# Patient Record
Sex: FEMALE | Race: WHITE
[De-identification: names, ages, dates, MRNs, and addresses within clinical notes are randomized per-mention and may not be internally consistent; named-entity substitution may affect disease eponyms.]

---

## 2019-07-08 ENCOUNTER — HOSPITAL ENCOUNTER (INPATIENT)
Dept: HOSPITAL 92 - ERS | Age: 64
LOS: 3 days | Discharge: HOME | DRG: 421 | End: 2019-07-11
Attending: INTERNAL MEDICINE | Admitting: INTERNAL MEDICINE
Payer: COMMERCIAL

## 2019-07-08 VITALS — BODY MASS INDEX: 19.5 KG/M2

## 2019-07-08 DIAGNOSIS — J47.9: ICD-10-CM

## 2019-07-08 DIAGNOSIS — E44.0: ICD-10-CM

## 2019-07-08 DIAGNOSIS — C80.1: ICD-10-CM

## 2019-07-08 DIAGNOSIS — Z87.891: ICD-10-CM

## 2019-07-08 DIAGNOSIS — Z66: ICD-10-CM

## 2019-07-08 DIAGNOSIS — I82.431: ICD-10-CM

## 2019-07-08 DIAGNOSIS — Z79.899: ICD-10-CM

## 2019-07-08 DIAGNOSIS — C78.01: ICD-10-CM

## 2019-07-08 DIAGNOSIS — I82.411: ICD-10-CM

## 2019-07-08 DIAGNOSIS — C78.7: Primary | ICD-10-CM

## 2019-07-08 LAB
ALBUMIN SERPL BCG-MCNC: 3.6 G/DL (ref 3.4–4.8)
ALP SERPL-CCNC: 415 U/L (ref 40–150)
ALT SERPL W P-5'-P-CCNC: 39 U/L (ref 8–55)
ANION GAP SERPL CALC-SCNC: 18 MMOL/L (ref 10–20)
AST SERPL-CCNC: 80 U/L (ref 5–34)
BACTERIA UR QL AUTO: (no result) HPF
BASOPHILS # BLD AUTO: 0 THOU/UL (ref 0–0.2)
BASOPHILS NFR BLD AUTO: 0.3 % (ref 0–1)
BILIRUB SERPL-MCNC: 1.2 MG/DL (ref 0.2–1.2)
BUN SERPL-MCNC: 18 MG/DL (ref 9.8–20.1)
CALCIUM SERPL-MCNC: 9.5 MG/DL (ref 7.8–10.44)
CHLORIDE SERPL-SCNC: 95 MMOL/L (ref 98–107)
CK SERPL-CCNC: 477 U/L (ref 29–168)
CO2 SERPL-SCNC: 24 MMOL/L (ref 23–31)
CREAT CL PREDICTED SERPL C-G-VRATE: 0 ML/MIN (ref 70–130)
EOSINOPHIL # BLD AUTO: 0.1 THOU/UL (ref 0–0.7)
EOSINOPHIL NFR BLD AUTO: 0.5 % (ref 0–10)
GLOBULIN SER CALC-MCNC: 3.1 G/DL (ref 2.4–3.5)
GLUCOSE SERPL-MCNC: 91 MG/DL (ref 80–115)
HGB BLD-MCNC: 14.2 G/DL (ref 12–16)
LIPASE SERPL-CCNC: 9 U/L (ref 8–78)
LYMPHOCYTES # BLD: 2.6 THOU/UL (ref 1.2–3.4)
LYMPHOCYTES NFR BLD AUTO: 14.5 % (ref 21–51)
MCH RBC QN AUTO: 30.1 PG (ref 27–31)
MCV RBC AUTO: 92.8 FL (ref 78–98)
MONOCYTES # BLD AUTO: 1.7 THOU/UL (ref 0.11–0.59)
MONOCYTES NFR BLD AUTO: 9.2 % (ref 0–10)
NEUTROPHILS # BLD AUTO: 13.5 THOU/UL (ref 1.4–6.5)
NEUTROPHILS NFR BLD AUTO: 75.6 % (ref 42–75)
PLATELET # BLD AUTO: 320 THOU/UL (ref 130–400)
POTASSIUM SERPL-SCNC: 4.2 MMOL/L (ref 3.5–5.1)
PROT UR STRIP.AUTO-MCNC: 30 MG/DL
RBC # BLD AUTO: 4.71 MILL/UL (ref 4.2–5.4)
RBC UR QL AUTO: (no result) HPF (ref 0–3)
SODIUM SERPL-SCNC: 133 MMOL/L (ref 136–145)
WBC # BLD AUTO: 17.9 THOU/UL (ref 4.8–10.8)
WBC UR QL AUTO: (no result) HPF (ref 0–3)

## 2019-07-08 PROCEDURE — 88342 IMHCHEM/IMCYTCHM 1ST ANTB: CPT

## 2019-07-08 PROCEDURE — 84484 ASSAY OF TROPONIN QUANT: CPT

## 2019-07-08 PROCEDURE — 80074 ACUTE HEPATITIS PANEL: CPT

## 2019-07-08 PROCEDURE — 84100 ASSAY OF PHOSPHORUS: CPT

## 2019-07-08 PROCEDURE — 77012 CT SCAN FOR NEEDLE BIOPSY: CPT

## 2019-07-08 PROCEDURE — 82550 ASSAY OF CK (CPK): CPT

## 2019-07-08 PROCEDURE — 83880 ASSAY OF NATRIURETIC PEPTIDE: CPT

## 2019-07-08 PROCEDURE — 71045 X-RAY EXAM CHEST 1 VIEW: CPT

## 2019-07-08 PROCEDURE — 93005 ELECTROCARDIOGRAM TRACING: CPT

## 2019-07-08 PROCEDURE — 81015 MICROSCOPIC EXAM OF URINE: CPT

## 2019-07-08 PROCEDURE — 83615 LACTATE (LD) (LDH) ENZYME: CPT

## 2019-07-08 PROCEDURE — 88307 TISSUE EXAM BY PATHOLOGIST: CPT

## 2019-07-08 PROCEDURE — 80048 BASIC METABOLIC PNL TOTAL CA: CPT

## 2019-07-08 PROCEDURE — 82105 ALPHA-FETOPROTEIN SERUM: CPT

## 2019-07-08 PROCEDURE — 80053 COMPREHEN METABOLIC PANEL: CPT

## 2019-07-08 PROCEDURE — 36415 COLL VENOUS BLD VENIPUNCTURE: CPT

## 2019-07-08 PROCEDURE — 85025 COMPLETE CBC W/AUTO DIFF WBC: CPT

## 2019-07-08 PROCEDURE — 83690 ASSAY OF LIPASE: CPT

## 2019-07-08 PROCEDURE — 71250 CT THORAX DX C-: CPT

## 2019-07-08 PROCEDURE — 82378 CARCINOEMBRYONIC ANTIGEN: CPT

## 2019-07-08 PROCEDURE — 88341 IMHCHEM/IMCYTCHM EA ADD ANTB: CPT

## 2019-07-08 PROCEDURE — 85610 PROTHROMBIN TIME: CPT

## 2019-07-08 PROCEDURE — 47000 NEEDLE BIOPSY OF LIVER PERQ: CPT

## 2019-07-08 PROCEDURE — 88334 PATH CONSLTJ SURG CYTO XM EA: CPT

## 2019-07-08 PROCEDURE — 85730 THROMBOPLASTIN TIME PARTIAL: CPT

## 2019-07-08 PROCEDURE — 82248 BILIRUBIN DIRECT: CPT

## 2019-07-08 PROCEDURE — 88333 PATH CONSLTJ SURG CYTO XM 1: CPT

## 2019-07-08 PROCEDURE — 84550 ASSAY OF BLOOD/URIC ACID: CPT

## 2019-07-08 PROCEDURE — 81003 URINALYSIS AUTO W/O SCOPE: CPT

## 2019-07-08 NOTE — ULT
VENOUS DOPPLER ULTRASOUND OF THE RIGHT LOWER EXTREMITY:

 

HISTORY:

Right lower extremity edema and pain.

 

TECHNIQUE:

Gray-scale ultrasound with color-flow and spectral Doppler imaging of the deep venous system of the r
ight lower extremity is performed.

 

FINDINGS:

There is absence of flow and compression in the right common femoral, femoral, deep femoral, and popl
iteal veins.  There is partial compression of the minimal flow in the right posterior tibial vein.  T
he right greater saphenous vein is patent.

 

IMPRESSION:

Deep venous thrombosis in the right lower extremity.

 

The results were called over the telephone to Lashawn Ramsey, Nurse Practitioner, in the emergency 
room, at 6 p.m.

 

CODE CR

 

POS: [facility]

## 2019-07-08 NOTE — RAD
PORTABLE CHEST ONE VIEW:

 

Date: 7-8-19

Time: 6:14 p.m.

 

History: Weakness, dehydration. 

 

Comparison: 6-12-19

 

FINDINGS: 

The heart is normal. The aorta is tortuous. The lungs are well expanded without focal areas of consol
idation, pneumothoraces or pleural effusions. 

 

IMPRESSION: 

No acute process. 

 

POS: [facility]

## 2019-07-09 LAB
ANION GAP SERPL CALC-SCNC: 16 MMOL/L (ref 10–20)
BASOPHILS # BLD AUTO: 0 THOU/UL (ref 0–0.2)
BASOPHILS NFR BLD AUTO: 0.3 % (ref 0–1)
BUN SERPL-MCNC: 16 MG/DL (ref 9.8–20.1)
CALCIUM SERPL-MCNC: 9.2 MG/DL (ref 7.8–10.44)
CHLORIDE SERPL-SCNC: 99 MMOL/L (ref 98–107)
CO2 SERPL-SCNC: 23 MMOL/L (ref 23–31)
CREAT CL PREDICTED SERPL C-G-VRATE: 0 ML/MIN (ref 70–130)
EOSINOPHIL # BLD AUTO: 0.1 THOU/UL (ref 0–0.7)
EOSINOPHIL NFR BLD AUTO: 0.6 % (ref 0–10)
GLUCOSE SERPL-MCNC: 84 MG/DL (ref 80–115)
HBCM INDEX: 0.05 S/CO (ref 0–0.79)
HBSAG INDEX: 0.31 S/CO (ref 0–0.99)
HEP A IGM S/CO: 0.1 S/CO (ref 0–0.79)
HEP C INDEX: 0.08 S/CO (ref 0–0.79)
HGB BLD-MCNC: 12.5 G/DL (ref 12–16)
LYMPHOCYTES # BLD: 1.9 THOU/UL (ref 1.2–3.4)
LYMPHOCYTES NFR BLD AUTO: 13.4 % (ref 21–51)
MCH RBC QN AUTO: 30 PG (ref 27–31)
MCV RBC AUTO: 93.8 FL (ref 78–98)
MONOCYTES # BLD AUTO: 1.6 THOU/UL (ref 0.11–0.59)
MONOCYTES NFR BLD AUTO: 11 % (ref 0–10)
NEUTROPHILS # BLD AUTO: 10.6 THOU/UL (ref 1.4–6.5)
NEUTROPHILS NFR BLD AUTO: 74.7 % (ref 42–75)
PLATELET # BLD AUTO: 291 THOU/UL (ref 130–400)
POTASSIUM SERPL-SCNC: 3.9 MMOL/L (ref 3.5–5.1)
RBC # BLD AUTO: 4.16 MILL/UL (ref 4.2–5.4)
SODIUM SERPL-SCNC: 134 MMOL/L (ref 136–145)
WBC # BLD AUTO: 14.2 THOU/UL (ref 4.8–10.8)

## 2019-07-09 RX ADMIN — HYDROCODONE BITARTRATE AND ACETAMINOPHEN PRN TAB: 5; 325 TABLET ORAL at 19:55

## 2019-07-09 RX ADMIN — Medication SCH ML: at 19:55

## 2019-07-09 RX ADMIN — HYDROCODONE BITARTRATE AND ACETAMINOPHEN PRN TAB: 5; 325 TABLET ORAL at 12:03

## 2019-07-09 NOTE — HP
REASON FOR ADMISSION:  Right lower extremity DVT, weight loss, loss of appetite 
with

recent diagnosis of metastasis to liver with unknown primary. 



HISTORY OF PRESENTING ILLNESS:  The patient gives history of feeling very weak 
with

right upper quadrant pain for several weeks and had come here in June.  She had

initial CAT scan done, which showed multiple metastases and she has seen Dr. Delarosa

on the 21st for the same.  Yesterday morning, the patient developed right lower

extremity edema with pain.  She was diagnosed with DVT on arrival here.  She 
also

has severe nausea, vomiting and has not been able to keep anything down.  She 
has

severe loss of appetite.  She has lost nearly 9 pounds in the last 2 weeks.  
She is

scheduled for a PET scan on Thursday and likely biopsy in 1 or 2 days per the

patient.  She has multiple metastases in the liver with unknown primary.  She 
has

not had a colonoscopy in the past. 



PAST MEDICAL AND SURGICAL HISTORY:  History of multiple liver metastases 
diagnosed

in June of this year with unknown primary and tobacco abuse.  No prior surgery. 



CURRENT MEDICATIONS:  None.



ALLERGIES:  NO KNOWN DRUG ALLERGIES.



PERSONAL HISTORY:  Quit smoking recently, was smoking one pack a day for nearly 
30

years.  She also was drinking one glass of wine with dinner.  Does not abuse 
drugs.

She lives with her .  They are  for nearly 29 years.  She does not

have any children, but has a stepson. 



FAMILY HISTORY:  No history of malignancy.



SOCIAL HISTORY:  The patient works for a [employer] company in [place].



CODE STATUS:  DNAR.  I have personally discussed this with the patient in room 
19 in

the ER. 



REVIEW OF SYSTEMS:  CONSTITUTIONAL:  Negative for weight loss or gain, ability 
to

conduct usual activities. 

SKIN:  Negative for rash, itching. 

EYES:  Negative for double vision, pain. 

ENT/MOUTH:  Negative for nose bleeding, neck stiffness, pain, tenderness. 

CARDIOVASCULAR:  Negative for palpitations, dyspnea on exertion, orthopnea. 

RESPIRATORY:  Negative for shortness of breath, wheezing, cough, hemoptysis, 
fever

or night sweats. 

GASTROINTESTINAL:  Negative for poor appetite, abdominal pain, heartburn, nausea
,

vomiting, constipation, or diarrhea. 

GENITOURINARY:  Negative for urgency, frequency, dysuria, nocturia. 

MUSCULOSKELETAL:  Negative for pain, swelling. 

NEUROLOGIC/PSYCHIATRIC:  Negative for anxiety, depression. 

ALLERGY/IMMUNOLOGIC:  Negative for skin rash, bleeding tendency.



PHYSICAL EXAMINATION:

GENERAL:  The patient is a 63-year-old female, who is currently not in any acute

distress. 

VITAL SIGNS:  Blood pressure 146/94, pulse 94 per minute, respiratory rate 18 
per

minute, temperature 98.4 degrees Fahrenheit, saturating 95% on room air. 

NECK:  Supple.  No elevated JVD. 

HEENT:  Eyes:  Extraocular muscles intact.  Pupils reacting to light.  Oral 
cavity:

Mucous membranes are dry.  No exudates or congestion. 

CARDIOVASCULAR SYSTEM:  S1 and S2 heard, regular rhythm. 

RESPIRATORY SYSTEM:  Air entry 1+ bilateral.  Scattered rhonchi plus.  No rales 
or

wheezes. 

ABDOMEN:  Mildly distended.  She has hepatomegaly.  There is tenderness over the

right upper quadrant.  The patient has severe sensitivity on her abdomen and 
feels

ticklish to touch.  No rigidity or guarding. 

EXTREMITIES:  Right lower extremity is edematous and there is erythema as well.

Left lower extremity, no calf tenderness or edema.  Peripheral pulses are 2+

bilateral.  No ischemic ulcerations or gangrene. 

CENTRAL NERVOUS SYSTEM:  No gross focal deficits noted.  The patient is alert,

awake, and oriented well. 

PSYCHIATRIC SYSTEM:  The patient's mood is a bit anxious, otherwise no

hallucinations or delusions. 



LABORATORY DATA:  White count of 17, H and H 14 and 43, platelet count 320 with 
75%

neutrophils, MCV is 92.  Sodium 133, serum chloride 95, BUN 18, creatinine 0.8, 
and

serum glucose 91.  AST 80, ALT 39, alkaline phosphatase 415, total bilirubin 
1.2.

CK levels 477.  Troponin-I less than 0.01.  BNP 26.  Albumin is 3.6.  Lipase is 
9. 



DIAGNOSTIC DATA:  Chest x-ray, portable, one view done shows no acute process.

Right lower extremity ultrasound done shows deep vein thrombosis with absence of

flow on compression in the right common femoral, femoral, deep femoral, and

popliteal veins.  EKG done shows sinus rhythm at 95 beats per minute.  There is 
T

inversion seen in II, III, aVF and V2, V3. 



CLINICAL IMPRESSION AND PLAN:  The patient will be admitted to oncology floor 
for 

right lower extremity deep venous thrombosis with history of multiple 
metastases in

the liver with unknown primary.  She has received a dose of Lovenox in the ER 
and

will continue at 50 mg subcu q.12 hourly.  Please note, the patient says she 
has had

occasional episodes of blood mixed with stool from last 2 weeks.  This will be

closely monitored, and if the patient were to bleed, her Lovenox will be held.  
Her

deep venous thrombosis is likely due to underlying malignancy.  She is also a 
heavy

smoker and we will obtain a CT chest to rule out mass without contrast 
initially and

will obtain with contrast if needed.  Acute hepatitis panel will be obtained as

well.  She has never had colonoscopy in her life.  It is unclear if she has any 
GI

malignancy.  We will obtain CEA levels and AFP levels as well.  The patient is

scheduled for a PET scan on Thursday and she is also scheduled for a biopsy in 
1 or

2 days per patient.  We will obtain oncology consultation with Dr. Delarosa.  Her

overall prognosis is poor to guarded.  She wants to be a do not attempt to

resuscitate and I have discussed code status with her at bedside.  Power of 


will be her . Please note I have seen and examined patient on 07/08/2019.







Job ID:  [id number]



MTDD

## 2019-07-09 NOTE — PRG
DATE OF SERVICE:  07/09/2019



SUBJECTIVE:  The patient actually feels a little bit better this morning.  She 
was

hungry and actually able to eat a fair amount of breakfast, which is the first 
food

she has eaten in a couple of days.  She had a rough night otherwise and was 
unable

to sleep.  She is still held in the emergency department at the moment due to 
lack

of beds on the floor. 



OBJECTIVE:  VITAL SIGNS:  Latest vitals; blood pressure 151/96, pulse 86,

respirations 18, and O2 saturation 95% on room air. 

GENERAL APPEARANCE:  Thin, age-appropriate female, in no distress.  Awake, alert
,

oriented, pleasant, and cooperative. 

HEENT:  JOSE.  No acute lesions. 

NECK:  Supple and symmetric. 

HEART:  Regular rate and rhythm without murmurs, gallops, or rubs. 

LUNGS:  Clear to auscultation bilaterally. 

ABDOMEN:  Soft and nondistended.  Positive bowel sounds. 

EXTREMITIES:  Right lower extremity has fairly diffuse edema without tenderness 
to

palpation. 



LABORATORY DATA:  White count 14.2, hemoglobin 12.5, and platelets 291.  Sodium 
134,

potassium 3.9, chloride 99, and CO2 of 23.  CEA is 16,884.  AFP 2.2.  Hepatitis

screen negative. 



IMAGING STUDIES:  CT of the chest did reveal some diffuse scattered pulmonary

nodules throughout both lungs consistent with metastatic disease.  There is also

evidence of bronchiectasis with suspected mucus plugging in the right middle 
lobe

and lingula. 



IMPRESSION AND PLAN:  

1. Metastatic cancer with lesions in the lung and the liver of unknown primary.
  The

patient still need a PET scan and a biopsy. 

2. Right lower extremity deep venous thrombosis.  In light of the fact that she

needs a biopsy, probably need to keep her on Lovenox and then get the biopsy 
done

and then potentially switch her back over to something like Eliquis.  I will 
discuss

the case with Dr. Delarosa and determine a plan for her going forward. 

3. Nausea and vomiting presently seems to be somewhat better and she was able 
to eat

breakfast this morning.  We will continue with the p.r.n. antiemetics. 







Job ID:  [id number]



MTDD

## 2019-07-09 NOTE — CT
CT CHEST WITHOUT CONTRAST:



 7/9/2019



2:45  a.m.



CLINICAL INDICATION:

A 63-year-old female with known hepatic metastatic disease, dehydration, weight loss, and weakness.



COMPARISON:

CT abdomen and pelvis, dated June 12, 2019.



FINDINGS:

Lung and Large Airways:  There is moderate emphysema.  There are scattered sub-4 mm pulmonary nodules
 throughout both lungs.  The largest pulmonary nodule is seen within the superior segment of right

lower lobe, measuring 4 mm, on image 28 of series 3.  There is bronchiectasis and suspected mucous pl
ugging involving the right middle lobe and lingula, which can be seen with MAC type infections.

Pleura:  No effusion or mass.

Vessels:  There are scattered vascular calcifications of the thoracic aorta and coronary arteries.  T
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 

Heart:  Normal appearing.  No pericardial effusion..

Mediastinum and Marisol:  Normal.

Chest Wall and Lower Neck:  Normal.

Upper Abdomen:  There are numerous hepatic hypodensities, suspicious for metastatic disease.

Bones:  No acute osseous abnormality.



IMPRESSION:

1.  Scattered pulmonary nodules throughout both lungs, many of which are below 4 mm in size.  The lar
gest pulmonary nodule is seen within the superior segment of the right lower lobe, measuring 4 mm. 

Short-term followup is recommended to evaluate stability versus interval growth.  Metastatic disease 
is not excluded.



2.  Bronchiectasis with suspected mucous plugging within the right middle lobe and lingula can be see
n with Mycobacterium avium complex type infections.  Short-term followup is recommended.



3.  Hepatic hypodensities, suspicious for metastatic disease.



Transcribed Date/Time: 7/9/2019 10:12 AM



Reported By: Jama Simeon 

Electronically Signed:  7/9/2019 12:27 PM

## 2019-07-10 LAB
APTT PPP: 28.5 SEC (ref 22.9–36.1)
INR PPP: 1.1
PROTHROMBIN TIME: 14 SEC (ref 12–14.7)

## 2019-07-10 PROCEDURE — 0FB24ZX EXCISION OF LEFT LOBE LIVER, PERCUTANEOUS ENDOSCOPIC APPROACH, DIAGNOSTIC: ICD-10-PCS | Performed by: RADIOLOGY

## 2019-07-10 RX ADMIN — HYDROCODONE BITARTRATE AND ACETAMINOPHEN PRN TAB: 5; 325 TABLET ORAL at 09:21

## 2019-07-10 RX ADMIN — Medication SCH ML: at 09:00

## 2019-07-10 RX ADMIN — Medication SCH ML: at 22:13

## 2019-07-10 RX ADMIN — HYDROCODONE BITARTRATE AND ACETAMINOPHEN PRN TAB: 5; 325 TABLET ORAL at 19:59

## 2019-07-10 NOTE — PDOC.PN
- Subjective


Encounter Start Date: 07/10/19


Encounter Start Time: 11:15





Doing well.  Had biopsy without difficulty.  Minimal soreness.





- Objective


Resuscitation Status - Order Detail:





07/09/19 02:38


Resuscitation Status Routine 


   Resuscitation Status: DNAR: NO Resuscitation


   Discussed with: d/w patient in ER room 19


   Additional comments: POA: 








Vital Signs & Weight: 


 Vital Signs (12 hours)











  Temp Pulse Resp BP Pulse Ox


 


 07/10/19 11:46  97.6 F  75  20  149/74 H  99


 


 07/10/19 08:37  97.9 F  78  18  146/78 H  94 L


 


 07/10/19 03:46  98.1 F  72  16  139/76  92 L








 Weight











Weight                         110 lb














I&O: 


 











 07/09/19 07/10/19 07/11/19





 06:59 06:59 06:59


 


Intake Total  1690 


 


Balance  1690 











Result Diagrams: 


 07/09/19 04:37





 07/09/19 04:37





Phys Exam





- Physical Examination


Respiratory: no wheezing, no rales, no rhonchi


Cardiovascular: RRR, no significant murmur


Gastrointestinal: soft, non-tender, no distention, positive bowel sounds


RLE Edema


Neurological: non-focal


Psychiatric: normal affect, A&O x 3





Dx/Plan


(1) Malignant neoplasm


Code(s): C80.1 - MALIGNANT (PRIMARY) NEOPLASM, UNSPECIFIED   Status: Acute   





(2) Metastases to the liver


Code(s): C78.7 - SECONDARY MALIG NEOPLASM OF LIVER AND INTRAHEPATIC BILE DUCT   

Status: Acute   





(3) Lung metastases


Code(s): C78.00 - SECONDARY MALIGNANT NEOPLASM OF UNSPECIFIED LUNG   Status: 

Acute   





(4) DVT (deep venous thrombosis)


Code(s): I82.409 - ACUTE EMBOLISM AND THOMBOS UNSP DEEP VN UNSP LOWER EXTREMITY

   Status: Acute   





(5) Tobacco abuse


Code(s): Z72.0 - TOBACCO USE   Status: Acute   





- Plan





* Had biopsy today.


* Will resume Lovenox this evening.


* If OK, will start Eliquis and DC in am.


* She has a PET scan scheduled for 9:00 and she should be able to go from here 

to there.

## 2019-07-10 NOTE — CT
CT Liver Perc Biopsy



CT GUIDED LIVER BIOPSY:



CLINICAL HISTORY:  Multiple hepatic lesions, of indeterminate etiology.



PROCEDURE: 

Informed consent was obtained and the patient was escorted to the procedural suite, placed in supine 
position. Conscious sedation for a total of 45 minutes was performed, administered by the radiology

nurse, with the patient consistently monitored throughout the duration of the exam in stable conditio
n. The patient's skin was prepped and draped in a standard sterile fashion and topical anesthesia

with buffered 1% lidocaine was performed. After a small skin incision was made, an 18-gauge needle we
re advanced to the leading edge of a mass within the anterior hepatic parenchyma, medial segment

left hepatic lobe. After adequate placement was confirmed with CT fluoroscopic imaging, 2 subsequent 
core specimens were obtained via percutaneous biopsy. These were confirmed with CT fluoroscopic

imaging and the specimens were submitted to the pathologist for adequacy. Specimens were deemed adequ
ate for interpretation. Therefore, all devices were then removed from the patient.



No unexpected procedural complications were present. The patient was returned back to the hospital [facility] for continued care.





IMPRESSION: 

Technically successful percutaneous hepatic mass biopsy.



Pathology results are pending.



Reported By: Jann Chen 

Electronically Signed:  7/10/2019 12:04 PM

## 2019-07-11 ENCOUNTER — HOSPITAL ENCOUNTER (OUTPATIENT)
Dept: HOSPITAL 92 - PET | Age: 64
Discharge: HOME | End: 2019-07-11
Attending: INTERNAL MEDICINE
Payer: COMMERCIAL

## 2019-07-11 VITALS — SYSTOLIC BLOOD PRESSURE: 160 MMHG | TEMPERATURE: 97.9 F | DIASTOLIC BLOOD PRESSURE: 96 MMHG

## 2019-07-11 DIAGNOSIS — C78.7: Primary | ICD-10-CM

## 2019-07-11 DIAGNOSIS — R85.9: ICD-10-CM

## 2019-07-11 LAB
BASOPHILS # BLD AUTO: 0 THOU/UL (ref 0–0.2)
BASOPHILS NFR BLD AUTO: 0.2 % (ref 0–1)
EOSINOPHIL # BLD AUTO: 0.1 THOU/UL (ref 0–0.7)
EOSINOPHIL NFR BLD AUTO: 0.7 % (ref 0–10)
HGB BLD-MCNC: 12.5 G/DL (ref 12–16)
LYMPHOCYTES # BLD: 1.5 THOU/UL (ref 1.2–3.4)
LYMPHOCYTES NFR BLD AUTO: 12.4 % (ref 21–51)
MCH RBC QN AUTO: 29.7 PG (ref 27–31)
MCV RBC AUTO: 93.5 FL (ref 78–98)
MONOCYTES # BLD AUTO: 1.1 THOU/UL (ref 0.11–0.59)
MONOCYTES NFR BLD AUTO: 9.4 % (ref 0–10)
NEUTROPHILS # BLD AUTO: 9.3 THOU/UL (ref 1.4–6.5)
NEUTROPHILS NFR BLD AUTO: 77.3 % (ref 42–75)
PLATELET # BLD AUTO: 298 THOU/UL (ref 130–400)
RBC # BLD AUTO: 4.2 MILL/UL (ref 4.2–5.4)
WBC # BLD AUTO: 12 THOU/UL (ref 4.8–10.8)

## 2019-07-11 PROCEDURE — A9552 F18 FDG: HCPCS

## 2019-07-11 PROCEDURE — 78815 PET IMAGE W/CT SKULL-THIGH: CPT

## 2019-07-11 NOTE — PDOC.PN
- Subjective


Encounter Start Date: 07/11/19


Encounter Start Time: 07:00


-: old records requested/rev





Patient seen and examined. No new complaints. No overnight events





- Objective


Resuscitation Status - Order Detail:





07/09/19 02:38


Resuscitation Status Routine 


   Resuscitation Status: DNAR: NO Resuscitation


   Discussed with: d/w patient in ER room 19


   Additional comments: POA: 








MAR Reviewed: Yes


Vital Signs & Weight: 


 Vital Signs (12 hours)











  Temp Pulse Resp BP BP Pulse Ox


 


 07/11/19 08:00  97.9 F  85  16  160/96 H   96


 


 07/10/19 23:29  98.3 F  95  16   143/91 H  95








 Weight











Weight                         110 lb














I&O: 


 











 07/10/19 07/11/19 07/12/19





 06:59 06:59 06:59


 


Intake Total 1690  


 


Balance 1690  











Result Diagrams: 


 07/11/19 04:08





 07/09/19 04:37





Phys Exam





- Physical Examination


Constitutional: NAD


HEENT: moist MMs, sclera anicteric


Neck: no JVD, supple


Respiratory: no wheezing, no rales, no rhonchi


Cardiovascular: RRR, no significant murmur, no rub


Gastrointestinal: soft, non-tender, no distention, positive bowel sounds


Musculoskeletal: no edema, pulses present


Neurological: non-focal, normal sensation


Lymphatic: no nodes


Psychiatric: normal affect


Skin: no rash, normal turgor





Dx/Plan


(1) DVT (deep venous thrombosis)


Code(s): I82.409 - ACUTE EMBOLISM AND THOMBOS UNSP DEEP VN UNSP LOWER EXTREMITY

   Status: Acute   





(2) Lung metastases


Code(s): C78.00 - SECONDARY MALIGNANT NEOPLASM OF UNSPECIFIED LUNG   Status: 

Acute   





(3) Malignant neoplasm


Code(s): C80.1 - MALIGNANT (PRIMARY) NEOPLASM, UNSPECIFIED   Status: Acute   





(4) Metastases to the liver


Code(s): C78.7 - SECONDARY MALIG NEOPLASM OF LIVER AND INTRAHEPATIC BILE DUCT   

Status: Acute   





(5) Tobacco abuse


Code(s): Z72.0 - TOBACCO USE   Status: Acute   





- Plan


cont current plan of care





* medication reviewed as below


* symptomatic treatment


* see discharge summery





Review of Systems





- Review of Systems


ENT: negative: Ear Pain, Ear Discharge, Nose Pain, Nose Discharge, Nose 

Congestion, Mouth Pain, Mouth Swelling, Throat Pain, Throat Swelling, Other


Respiratory: negative: Cough, Dry, Shortness of Breath, Hemoptysis, SOB with 

Excertion, Pleuritic Pain, Sputum, Wheezing


Cardiovascular: negative: chest pain, palpitations, orthopnea, paroxysmal 

nocturnal dyspnea, edema, light headedness, other


Gastrointestinal: negative: Nausea, Vomiting, Abdominal Pain, Diarrhea, 

Constipation, Melena, Hematochezia, Other


Genitourinary: negative: Dysuria, Frequency, Incontinence, Hematuria, Retention

, Other


Musculoskeletal: negative: Neck Pain, Shoulder Pain, Arm Pain, Back Pain, Hand 

Pain, Leg Pain, Foot Pain, Other


Skin: negative: Rash, Lesions, Esau, Bruising, Other





- Medications/Allergies


Allergies/Adverse Reactions: 


 Allergies











Allergy/AdvReac Type Severity Reaction Status Date / Time


 


No Known Allergies Allergy   Unverified 07/09/19 02:57

## 2019-07-11 NOTE — DIS
DATE OF ADMISSION:  07/09/2019



DATE OF DISCHARGE:  07/11/2019



PRIMARY CARE PHYSICIAN:  Dr. Delarosa.



DISCHARGE DISPOSITION:  Home.



PRIMARY DISCHARGE DIAGNOSES:  

1. Deep vein thrombosis.

2. Metastasis to liver and lungs.

3. Tobacco abuse disorder.



PRIMARY PROCEDURE/OPERATION:  Liver biopsy.



RADIOLOGICAL INVESTIGATION:  

1. CT chest showed multiple pulmonary nodules.

2. Ultrasound of lower extremity positive for deep vein thrombosis in the right leg.

3. Chest x-ray unremarkable.

4. Liver biopsy. 

5. CT scan.



SIGNIFICANT LABORATORY DATA:  Hemoglobin 12.5, WBC 12.0, platelet 298.  INR 1.1.

Sodium 134, creatinine 0.78, calcium 9.2.  AST 80, ALT 39, alkaline phosphatase 415.

 .  Troponin negative.  BNP normal.  Carcinoembryonic antigen 16,884.  Tumor

alpha-fetoprotein level 2.2.  Urinalysis; bilirubin positive, hepatitis profile

negative. 



DISCHARGE MEDICATIONS:  

1. Eliquis 10 mg p.o. b.i.d. until August 16, 2019, and after that 5 mg p.o. b.i.d.

2. Tramadol 50 mg as directed.

3. Zofran 4 mg sublingual p.r.n. for nausea.

4. Lasix 20 mg as directed for edema.



CONTRAINDICATION:  None.



CODE STATUS:  DNR.



INPATIENT CONSULTANT:  [facility] was consulted while in hospital.



TEST RESULTS PENDING ON DISCHARGE:  Liver biopsy report.



DISCHARGE PLAN:  Post hospital, the patient has a followup appointment with Dr. Delarosa.  The patient is going for a PET scan after discharge. 



HOSPITAL COURSE:  A 63-year-old female with above-mentioned medical problem, who was

admitted by Dr. Butler.  Please see his H and P for further details.  The

patient was admitted for right lower extremity deep vein thrombosis.  She was

treated with Lovenox.  She was changed to Eliquis on discharge.  The patient had

liver biopsy done while in hospital.  Pathology report is pending.  The patient has

outpatient PET scan scheduled.  This patient has metastasis to liver and lung,

primary and type of cancer is not known yet.  The patient will follow up with

Oncology. 



The patient is seen and examined at bedside today.  Please see my progress note from

today for further details. 







Job ID:  [id number]

## 2019-07-11 NOTE — PQF
Date:             7-11-19                                                    
ATTN:  DR. LIBERTY BARNARD



Please exercise your independent, professional judgment in responding to the 
clarification form. 

Clinical indicators are provided on the bottom of this form for your review



Please check appropriate box(s):

[ x ] Protein Calorie Malnutrition:    [  ] Mild      [x  ] Moderate   [  ] 
Severe   

[  ] Cachexia 

[  ] Other diagnosis ___________

[  ] Unable to determine



In addition, please specify:

Present on Admission (POA):  [ x ] Yes             [  ] No             [  ] 
Unable to determine



CLINICAL INDICATORS - SIGNS / SYMPTOMS / LABS



BMI of    19.5



H&P:  WEIGHT LOSS, LOSS OF APPETITE WITH RECENT DIAGNOSIS OF METASTASIS TO LIVER

    WITH UNK PRIMARY,  SEVERE NAUSEA, VOMITING AND HAS NOT BEEN ABLE TO KEEP 
ANYTHING

    DOWN, HAS SEVERE LOSS OF APPETITE, SHE HAS LOST NEARLY 9 POUNDS IN THE LAST 
2 WEEKS



RISK FACTORS:



    H&P:  WEIGHT LOSS, LOSS OF APPETITE WITH RECENT DIAGNOSIS OF METASTASIS TO 
LIVER WITH

    UNK PRIMARY, SEVERE NAUSEA, VOMITING AND HAS NOT BEEN ABLE TO KEEP ANYTHING 
DOWN, 

    HAS SEVERE LOSS OF APPETITE, SHE HAS LOST NEARLY 9 POUNDS IN THE LAST 2 WEEK



TREATMENT:



    ER:  NS IVF

    MAR:  TRE HOWARD 7-10-19:   ABLE TO EAT BREAKFAST, CONTINUE WITH ORAL 
ANTIEMETICS



Moderate Malnutrition (in acute illness)

Energy Intake: <75% of estimated energy requirement for > 7 days

Weight Loss:  1-2%/1 week;  5%/ 1 month; 7.5%/3 months

Other: mild body fat loss; mild muscle mass loss; mild fluid accumulation; 

Severe Malnutrition (in acute illness)

Energy Intake: < 50% of estimated energy requirement for > 5 days

Weight Loss: >1-2%/1 week; >5%/1 month; >7.5%/3 months

Other: moderate body fat loss; moderate muscle mass loss; moderate- severe 
fluid accumulation; measurably reduced  strength

Moderate Malnutrition (in chronic illness)

Energy Intake: <75% of estimated energy requirement for >1 month

Weight Loss: 5%/1 month; 7.5%/3 months; 10%/6 months; 20%/1 year

Other: mild body fat loss; mild muscle mass loss; mild fluid accumulation

Severe Malnutrition (in chronic illness)

Energy Intake: <75% of estimated energy requirement for >1 month

Weight Loss: >5%/1 month; >7.5%/3 months; >10%/6 months; >20%/1 year

Other: severe body fat loss; severe muscle mass loss; severe fluid accumulation
; measurably reduced  strength



(This form is maintained as a part of the permanent medical record)

 2015 [email or web address].  All Rights Reserved

[email or web address]    Office:  [phone]

                                                              

 

[facility]

## 2019-07-11 NOTE — PET
Radionucleotide PET scan with CT attenuation correction



HISTORY: Hepatic metastases.



Physiologic uptake of radiotracer throughout the enteric system and along each urinary tract. Within 
the lower left colon, a focal area of masslike increased radiotracer uptake shows a maximum SUV of

5.6.



A small focus of increased uptake within the central portion of the left mid humeral shaft shows a ma
ximum SUV of 3.1.



Throughout each liver lobe, there are innumerable hypermetabolic masses, predominantly replacing the 
normal liver parenchyma. Cysts are also apparent within the liver. Maximum SUV is in the right

liver lobe, 7.3.



The tiny lung nodules on recent CT are too small to be confidently visualized on PET scan. No hyperme
tabolic activity is visible.







Nondiagnostic CT attenuation correction images show increase in free fluid in the dependent portion o
f the pelvis. Calcification in the arterial structures. An aberrant right subclavian artery.









IMPRESSION: Extensive metastatic disease of the liver. Probable metastatic focus of the humerus.



Abnormality at the left colon may represent primary neoplasm. Please consider endoscopic evaluation.



Reported By: LEROY Ponce 

Electronically Signed:  7/11/2019 1:11 PM

## 2019-07-13 NOTE — EKG
Test Reason : 

Blood Pressure : ***/*** mmHG

Vent. Rate : 095 BPM     Atrial Rate : 095 BPM

   P-R Int : 118 ms          QRS Dur : 074 ms

    QT Int : 358 ms       P-R-T Axes : 043 035 -62 degrees

   QTc Int : 449 ms

 

Sinus rhythm with Premature atrial complexes

T wave abnormality, consider inferior ischemia

T wave abnormality, consider anterior ischemia

Abnormal ECG

 

Confirmed by GOLDBERG M.D., ADRIANA ([id number]),  PADMINI MASTERS ([id number]) on 7/13/2019 11:11:19 AM

 

Referred By:             Confirmed By:ADRIANA GOLDBERG M.D.

## 2019-07-22 ENCOUNTER — HOSPITAL ENCOUNTER (OUTPATIENT)
Dept: HOSPITAL 92 - SDC | Age: 64
Discharge: HOME | End: 2019-07-22
Attending: SPECIALIST
Payer: COMMERCIAL

## 2019-07-22 VITALS — BODY MASS INDEX: 19.3 KG/M2

## 2019-07-22 DIAGNOSIS — Z79.01: ICD-10-CM

## 2019-07-22 DIAGNOSIS — F17.210: ICD-10-CM

## 2019-07-22 DIAGNOSIS — C18.9: ICD-10-CM

## 2019-07-22 DIAGNOSIS — C78.7: Primary | ICD-10-CM

## 2019-07-22 PROCEDURE — C1788 PORT, INDWELLING, IMP: HCPCS

## 2019-07-22 PROCEDURE — 71045 X-RAY EXAM CHEST 1 VIEW: CPT

## 2019-07-22 PROCEDURE — 05H533Z INSERTION OF INFUSION DEVICE INTO RIGHT SUBCLAVIAN VEIN, PERCUTANEOUS APPROACH: ICD-10-PCS | Performed by: SPECIALIST

## 2019-07-22 NOTE — RAD
CHEST 1 VIEW:

 

HISTORY: 

MediPort placement.

 

COMPARISON: 

Chest radiograph 7/8/2019.

 

FINDINGS: 

A right subclavian approach port catheter tip sits at the inferior SVC in good position.  Lungs witho
ut focal confluent airspace consolidation, pneumothorax, or effusion.

 

IMPRESSION: 

Uncomplicated placement of port catheter.

 

POS: CET

## 2019-07-23 NOTE — OP
DATE OF PROCEDURE:  07/22/2019



PREOPERATIVE DIAGNOSIS:  Metastatic colorectal cancer.



POSTOPERATIVE DIAGNOSIS:  Metastatic colorectal cancer.



PROCEDURE PERFORMED:  Placement of right subclavian low-profile power compatible

MediPort. 



ANESTHESIA:  Total intravenous anesthesia with local using 0.25% Marcaine with

epinephrine. 



INDICATIONS:  The patient is a 63-year-old white female.  She presents with multiple

metastatic lesions to her liver, presumably emanating from her colon.  Neoadjuvant

chemotherapy was recommended.  She is taken to the operative room at this time for

MediPort placement. 



DESCRIPTION OF OPERATION:  Informed consent was obtained.  The patient was taken to

the operating room where total intravenous anesthesia was obtained with the patient

in supine position.  Right periclavicular area was prepped with ChloraPrep and

draped in sterile fashion.  Local anesthetic was infiltrated and a large-gauge

needle was passed under the clavicle in the subclavian vein.  Guidewire was passed

through the needle and fluoroscopically confirmed to enter the superior vena cava.

Additional local anesthetic was infiltrated and transverse incision was created

based on needle insertion site.  A subcutaneous pocket was dissected inferiorly.

Introducer dilator was passed over the guidewire under fluoroscopic guidance.  The

guidewire and dilator were removed, and the catheter was passed through the

introducer.  The tip of the catheter was positioned at the atriocaval junction and

the catheter was trimmed to the appropriate length and secured to the locking hub of

the MediPort.  The port was then placed in the subcutaneous pocket where it was

secured to the pectoral fascia with 2 interrupted sutures of 3-0 Prolene.  The

incision was then closed in layers with 3-0 and 4-0 Monocryl.  Additional local

anesthetic was infiltrated.  The port was cannulated with a Arita needle and it

aspirated blood freely and was flushed with heparinized saline.  Dermabond was

placed externally on the skin incision.  There were no complications.  Blood loss

was negligible.  The patient tolerated the procedure well and was taken to recovery

room in stable condition. 



FINDINGS:  A low-profile port was selected secondary to her thin body habitus.  Her

anatomy was unremarkable and the port was placed uneventfully with essentially no

blood loss.  Fluoroscopy was used throughout.  There were no complications.  The

patient tolerated the procedure well. 







Job ID:  [id number]

## 2019-10-17 ENCOUNTER — HOSPITAL ENCOUNTER (OUTPATIENT)
Dept: HOSPITAL 92 - PET | Age: 64
Discharge: HOME | End: 2019-10-17
Attending: INTERNAL MEDICINE
Payer: COMMERCIAL

## 2019-10-17 DIAGNOSIS — C18.9: Primary | ICD-10-CM

## 2019-10-17 DIAGNOSIS — C78.7: ICD-10-CM

## 2019-10-17 PROCEDURE — A9552 F18 FDG: HCPCS

## 2019-10-17 PROCEDURE — 78815 PET IMAGE W/CT SKULL-THIGH: CPT

## 2019-10-17 NOTE — PET
PET CT:

 

HISTORY:  

64-year-old female with colon cancer and liver mets. Patient ongoing chemotherapy. Exam requested to 
evaluate response to treatment. 

 

TECHNIQUE:  

PET scanning with CT attenuation correction was performed from the base of the brain through the prox
imal thighs following the intravenous administration of 11.4 mCi F18-FDG in the right wrist. 

 

COMPARISON:  

PET CT of 07/11/19. 

 

FINDINGS:

Numerous hypermetabolic lesions in the liver are again seen with a maximum SUV of 6.3 in the right lo
be (previously 7.3). 

 

No john hypermetabolism is seen in the neck, chest, axilla, abdomen, or pelvis. No hypermetabolic pu
lmonary nodules or adrenal lesions are seen. 

 

There is interval development of a focal area of increased uptake in the left sacrum with a SUV of 5.
9. The previously noted focus of increased uptake in the left mid humeral shaft is not seen on the cu
rrent exam. 

 

There is physiologic activity in the GI and  tracts, and visualized portions of the brain. 

 

The CT scan used for attenuation correction demonstrates no evidence of pleural effusions or ascites.
 

 

IMPRESSION: 

Mixed response to therapy since 07/11/19. 

 

 

POS: [facility]

## 2019-12-16 ENCOUNTER — HOSPITAL ENCOUNTER (INPATIENT)
Dept: HOSPITAL 92 - ERS | Age: 64
LOS: 7 days | Discharge: HOME | DRG: 392 | End: 2019-12-23
Attending: INTERNAL MEDICINE | Admitting: INTERNAL MEDICINE
Payer: COMMERCIAL

## 2019-12-16 VITALS — BODY MASS INDEX: 17.9 KG/M2

## 2019-12-16 DIAGNOSIS — E86.0: ICD-10-CM

## 2019-12-16 DIAGNOSIS — I10: ICD-10-CM

## 2019-12-16 DIAGNOSIS — C79.51: ICD-10-CM

## 2019-12-16 DIAGNOSIS — C78.00: ICD-10-CM

## 2019-12-16 DIAGNOSIS — M47.896: ICD-10-CM

## 2019-12-16 DIAGNOSIS — R11.2: Primary | ICD-10-CM

## 2019-12-16 DIAGNOSIS — Z86.718: ICD-10-CM

## 2019-12-16 DIAGNOSIS — C18.9: ICD-10-CM

## 2019-12-16 DIAGNOSIS — R53.1: ICD-10-CM

## 2019-12-16 DIAGNOSIS — Z51.5: ICD-10-CM

## 2019-12-16 DIAGNOSIS — T45.8X5A: ICD-10-CM

## 2019-12-16 DIAGNOSIS — D72.829: ICD-10-CM

## 2019-12-16 DIAGNOSIS — R18.8: ICD-10-CM

## 2019-12-16 DIAGNOSIS — Z87.891: ICD-10-CM

## 2019-12-16 DIAGNOSIS — C78.7: ICD-10-CM

## 2019-12-16 DIAGNOSIS — R10.9: ICD-10-CM

## 2019-12-16 DIAGNOSIS — R64: ICD-10-CM

## 2019-12-16 LAB
ALBUMIN SERPL BCG-MCNC: 2.4 G/DL (ref 3.4–4.8)
ALP SERPL-CCNC: 544 U/L (ref 40–110)
ALT SERPL W P-5'-P-CCNC: 35 U/L (ref 8–55)
ANION GAP SERPL CALC-SCNC: 15 MMOL/L (ref 10–20)
ANISOCYTOSIS BLD QL SMEAR: (no result) (100X)
AST SERPL-CCNC: 58 U/L (ref 5–34)
BILIRUB SERPL-MCNC: 1 MG/DL (ref 0.2–1.2)
BUN SERPL-MCNC: 13 MG/DL (ref 9.8–20.1)
CALCIUM SERPL-MCNC: 8.9 MG/DL (ref 7.8–10.44)
CHLORIDE SERPL-SCNC: 104 MMOL/L (ref 98–107)
CK SERPL-CCNC: 64 U/L (ref 29–168)
CO2 SERPL-SCNC: 19 MMOL/L (ref 23–31)
CREAT CL PREDICTED SERPL C-G-VRATE: 0 ML/MIN (ref 70–130)
GLOBULIN SER CALC-MCNC: 2.9 G/DL (ref 2.4–3.5)
GLUCOSE SERPL-MCNC: 104 MG/DL (ref 80–115)
HGB BLD-MCNC: 13.5 G/DL (ref 12–16)
LIPASE SERPL-CCNC: 7 U/L (ref 8–78)
MACROCYTES BLD QL SMEAR: (no result) (100X)
MCH RBC QN AUTO: 32.7 PG (ref 27–31)
MCV RBC AUTO: 102 FL (ref 78–98)
MDIFF COMPLETE?: YES
PLATELET # BLD AUTO: 149 THOU/UL (ref 130–400)
POLYCHROMASIA BLD QL SMEAR: (no result) (100X)
POTASSIUM SERPL-SCNC: 4.4 MMOL/L (ref 3.5–5.1)
PROT UR STRIP.AUTO-MCNC: 10 MG/DL
RBC # BLD AUTO: 4.12 MILL/UL (ref 4.2–5.4)
REFLEX FOR REVIEW??: YES
SODIUM SERPL-SCNC: 134 MMOL/L (ref 136–145)
TOXIC GRANULES BLD QL SMEAR: SLIGHT
WBC # BLD AUTO: 49.2 THOU/UL (ref 4.8–10.8)

## 2019-12-16 PROCEDURE — 72197 MRI PELVIS W/O & W/DYE: CPT

## 2019-12-16 PROCEDURE — 85060 BLOOD SMEAR INTERPRETATION: CPT

## 2019-12-16 PROCEDURE — 87040 BLOOD CULTURE FOR BACTERIA: CPT

## 2019-12-16 PROCEDURE — 94760 N-INVAS EAR/PLS OXIMETRY 1: CPT

## 2019-12-16 PROCEDURE — 72158 MRI LUMBAR SPINE W/O & W/DYE: CPT

## 2019-12-16 PROCEDURE — 74177 CT ABD & PELVIS W/CONTRAST: CPT

## 2019-12-16 PROCEDURE — 36416 COLLJ CAPILLARY BLOOD SPEC: CPT

## 2019-12-16 PROCEDURE — 51701 INSERT BLADDER CATHETER: CPT

## 2019-12-16 PROCEDURE — 36415 COLL VENOUS BLD VENIPUNCTURE: CPT

## 2019-12-16 PROCEDURE — 96365 THER/PROPH/DIAG IV INF INIT: CPT

## 2019-12-16 PROCEDURE — 82607 VITAMIN B-12: CPT

## 2019-12-16 PROCEDURE — 83735 ASSAY OF MAGNESIUM: CPT

## 2019-12-16 PROCEDURE — 93005 ELECTROCARDIOGRAM TRACING: CPT

## 2019-12-16 PROCEDURE — 83690 ASSAY OF LIPASE: CPT

## 2019-12-16 PROCEDURE — 96361 HYDRATE IV INFUSION ADD-ON: CPT

## 2019-12-16 PROCEDURE — 85025 COMPLETE CBC W/AUTO DIFF WBC: CPT

## 2019-12-16 PROCEDURE — 70450 CT HEAD/BRAIN W/O DYE: CPT

## 2019-12-16 PROCEDURE — 84484 ASSAY OF TROPONIN QUANT: CPT

## 2019-12-16 PROCEDURE — 80202 ASSAY OF VANCOMYCIN: CPT

## 2019-12-16 PROCEDURE — 81003 URINALYSIS AUTO W/O SCOPE: CPT

## 2019-12-16 PROCEDURE — 83605 ASSAY OF LACTIC ACID: CPT

## 2019-12-16 PROCEDURE — 85027 COMPLETE CBC AUTOMATED: CPT

## 2019-12-16 PROCEDURE — 82550 ASSAY OF CK (CPK): CPT

## 2019-12-16 PROCEDURE — A4353 INTERMITTENT URINARY CATH: HCPCS

## 2019-12-16 PROCEDURE — 74019 RADEX ABDOMEN 2 VIEWS: CPT

## 2019-12-16 PROCEDURE — 80053 COMPREHEN METABOLIC PANEL: CPT

## 2019-12-16 PROCEDURE — 96375 TX/PRO/DX INJ NEW DRUG ADDON: CPT

## 2019-12-16 PROCEDURE — 82140 ASSAY OF AMMONIA: CPT

## 2019-12-16 PROCEDURE — 82746 ASSAY OF FOLIC ACID SERUM: CPT

## 2019-12-16 PROCEDURE — 96367 TX/PROPH/DG ADDL SEQ IV INF: CPT

## 2019-12-16 PROCEDURE — 85007 BL SMEAR W/DIFF WBC COUNT: CPT

## 2019-12-16 NOTE — CT
CT ABDOMEN AND PELVIS WITH IV CONTRAST:

 

History: Colon cancer, lung mets. Abdominal pain. 

 

Comparison: CT abdomen and pelvis, 6-12-19.

 

FINDINGS:

Multiple lung nodules are seen in the visualized lung fields, most of them new since the previous enriqueta
dy of 6-12-19. Tiny bilateral pleural effusions are present. Numerous liver masses are again noted, s
ome of them cysts and the remainder metastases with interval worsening. The spleen, pancreas, adrenal
 glands, and kidneys are unremarkable. No calcified gallstones are seen. 

 

No free air is noted. There is free fluid in the abdomen and pelvis consistent with mild ascites. The
 small bowel loops are not abnormally dilated. 

 

Fibroid uterus is again seen. There are vascular calcifications without evidence of aneurysmal dilata
tion of the abdominal aorta. No osteoblastic or osteolytic lesions are noted. 

 

IMPRESSION: 

1. Pulmonary and hepatic metastases with interval worsening since 6-12-19.

2. Mild ascites.  

 

POS: [facility]

## 2019-12-16 NOTE — CT
CT head without contrast:

Multiple axial tomograms obtained through the head without IV enhancement.



INDICATIONS: Weakness. Mental status change. Near-syncope.



COMPARISON: None



FINDINGS:

Ventricles have normal size and position.

No evidence of intracranial mass, hemorrhage, edema, or infarct.

Mucosal opacification and air-fluid level in left sphenoid air cell.

Bony calvarium appears unremarkable.



IMPRESSION:

No acute intracranial abnormality.

Air-fluid level left sphenoid sinus



Reported By: Kenn Brand 

Electronically Signed:  12/16/2019 7:15 PM

## 2019-12-17 LAB
ALBUMIN SERPL BCG-MCNC: 2.4 G/DL (ref 3.4–4.8)
ALP SERPL-CCNC: 373 U/L (ref 40–110)
ALT SERPL W P-5'-P-CCNC: 32 U/L (ref 8–55)
ANION GAP SERPL CALC-SCNC: 14 MMOL/L (ref 10–20)
AST SERPL-CCNC: 54 U/L (ref 5–34)
BILIRUB SERPL-MCNC: 0.7 MG/DL (ref 0.2–1.2)
BUN SERPL-MCNC: 15 MG/DL (ref 9.8–20.1)
CALCIUM SERPL-MCNC: 8.5 MG/DL (ref 7.8–10.44)
CHLORIDE SERPL-SCNC: 103 MMOL/L (ref 98–107)
CO2 SERPL-SCNC: 22 MMOL/L (ref 23–31)
CREAT CL PREDICTED SERPL C-G-VRATE: 64 ML/MIN (ref 70–130)
GLOBULIN SER CALC-MCNC: 2.9 G/DL (ref 2.4–3.5)
GLUCOSE SERPL-MCNC: 148 MG/DL (ref 80–115)
HGB BLD-MCNC: 9.5 G/DL (ref 12–16)
MAGNESIUM SERPL-MCNC: 2.1 MG/DL (ref 1.6–2.6)
MCH RBC QN AUTO: 33.1 PG (ref 27–31)
MCV RBC AUTO: 101 FL (ref 78–98)
MDIFF COMPLETE?: YES
PLATELET # BLD AUTO: 107 THOU/UL (ref 130–400)
POTASSIUM SERPL-SCNC: 4.2 MMOL/L (ref 3.5–5.1)
RBC # BLD AUTO: 2.87 MILL/UL (ref 4.2–5.4)
SODIUM SERPL-SCNC: 135 MMOL/L (ref 136–145)
WBC # BLD AUTO: 30.1 THOU/UL (ref 4.8–10.8)

## 2019-12-17 RX ADMIN — PIPERACILLIN SODIUM, TAZOBACTAM SODIUM SCH MLS: 2; .25 INJECTION, POWDER, LYOPHILIZED, FOR SOLUTION INTRAVENOUS at 02:21

## 2019-12-17 RX ADMIN — PIPERACILLIN SODIUM, TAZOBACTAM SODIUM SCH MLS: 2; .25 INJECTION, POWDER, LYOPHILIZED, FOR SOLUTION INTRAVENOUS at 17:31

## 2019-12-17 RX ADMIN — DOCUSATE SODIUM 50 MG AND SENNOSIDES 8.6 MG SCH TAB: 8.6; 5 TABLET, FILM COATED ORAL at 21:09

## 2019-12-17 RX ADMIN — Medication SCH ML: at 09:19

## 2019-12-17 RX ADMIN — PIPERACILLIN SODIUM, TAZOBACTAM SODIUM SCH MLS: 2; .25 INJECTION, POWDER, LYOPHILIZED, FOR SOLUTION INTRAVENOUS at 09:19

## 2019-12-17 RX ADMIN — Medication SCH: at 21:11

## 2019-12-17 NOTE — CON
DATE OF CONSULTATION:  2019



SERVICE:  Pulmonary Medicine.



REASON FOR CONSULTATION:  ICU patient.



HISTORY OF PRESENT ILLNESS:  The patient is a 64-year-old white female with past

medical history significant for colorectal cancer.  She apparently was in her 
usual

state of health, though she has been having some constipation for the last 
couple of

days.  She presented to outpatient chemotherapy and was otherwise okay, but 
then had

an event, in which she became very confused, lightheaded/dizzy.  Blood pressure 
was

checked and it was low.  As such, she was brought to the Emergency Department.

After interventions there, her blood pressure improved significantly.  Overnight
,

she has returned to her usual state of health.  She has no fevers, chills, 
nausea,

or vomiting.  There were no significant events.  Her p.o. intake has been poor.
  She

spends a lot of time with severe nausea, and vomiting.  She also notes some

constipation, but otherwise has not had any diarrhea.  Her review of systems is 
only

positive for abdominal discomfort, which is currently at baseline. 



PAST MEDICAL HISTORY:  

1. Colon cancer, widely metastatic.

2. History of DVT, chronically anticoagulated.



PAST SURGICAL HISTORY:  

1. Liver biopsy.

2. Port-A-Cath placement.



FAMILY HISTORY:  Noncontributory.



SOCIAL HISTORY:  Negative for significant alcohol, tobacco, or illicit drug use

currently.  She has no exposures to chemicals, dust, asbestos, or tuberculosis.
  She

has a 30-pack-year history of smoking, but quit recently. 



ALLERGIES:  NO KNOWN DRUG ALLERGIES.



MEDICATIONS:  List of her inpatient medications was reviewed.  No updates were 
made

at this time. 



REVIEW OF SYSTEMS:  General, head, ears, eyes, nose, throat, cardiovascular,

respiratory, GI, , musculoskeletal, neurologic, and skin are negative except 
as

mentioned in the HPI. 



PHYSICAL EXAMINATION:

VITAL SIGNS:  Afebrile, pulse 78, blood pressure 138/76, respirations 14, and

saturation 98%, currently on room air. 

GENERAL:  The patient is awake and alert, in no apparent distress. 

LUNGS:  Good air entry bilaterally.  There is no crackles, wheezing, or rhonchi

present. 

HEART:  Normal rate.  Regular. 

ABDOMEN:  Distended.  Bowel sounds are present.  It is tender to palpation

throughout.  There is no significant rebound or guarding present. 

MUSCULOSKELETAL:  No cyanosis or clubbing.  There is no pitting in the bilateral

lower extremities.  She has skin tenting throughout. 

:  No Méndez catheter.



LABORATORY DATA:  WBC 30.1 and downtrending, hemoglobin 9.5 and also 
downtrending.

MCV is 101.  Platelet count 107 and downtrending.  Band count is 28%, but 
improving.

 Basic metabolic profile and liver function studies are currently unremarkable.

Lactate was originally 6.8, but improved to 3.2 this morning.  Liver function

studies are unremarkable except for an alkaline phosphatase of 373, which is 
gently

downtrending.  Ammonia 27.  Urinalysis is unremarkable.  Blood cultures x2 are

negative. 



IMAGIN. CT of the abdomen and pelvis demonstrates pulmonary and hepatic metastases, 
which

have intervally gotten worse since 2019.  Mild ascites are noted. 

2. CT of the brain demonstrates no acute intracranial abnormality.



ASSESSMENT:  

1. Severe sepsis.

2. Dehydration.

3. Colon cancer, widely metastatic.



DISCUSSION AND PLAN:  We will check a B12 and a folate.  I will put her on a 
bowel

regimen.  Empiric antibiotics will be continued.  From my perspective, she is 
stable

for transition out of the ICU to the Oncology floor.  When she arrives there, 
she

will have no further requirements for Pulmonary or Critical Care opinion, and I 
will

sign off.  Please call with additional questions or concerns through time. 



70 minutes have been devoted to this patient in various activities.  I 
personally reviewed all imaging studies and laboratory data noted within this 
document.  For fifty percent of this time, I was interacting with the patient 
at the bedside or coordinating care with the care team.  For the remainder of 
the time I was immediately available to the patient in the hospital unit.  





Job ID:  [id number]



MTDD

## 2019-12-17 NOTE — CON
DATE OF CONSULTATION:  



REASON FOR CONSULTATION:  Colon cancer.



HISTORY OF PRESENT ILLNESS:  Ms. Richards is a 64-year-old female, who is undergoing

treatment with FOLFOX and Avastin for metastatic colon cancer.  She was in our

clinic yesterday and received cycle 11/12 and tolerated it well.  After treatment

was completed, the patient ambulated independently to the bathroom.  She did ask her

 for assistance.  She was attempting to disimpact herself and when she stood

up to put her pants on, she became weak and was unable to answer questions.  She was

placed in a wheelchair and assisted back to the recliner.  Her vital signs were

stable.  After several minutes, she continued to respond to some questions only.

The decision was made to transfer her to the ER for further workup.  Within the ER,

she underwent a brain CT, which showed no acute findings.  She underwent an

abdominal and pelvis CT showing worsening lung and liver METS from the scan dated

06/12/2019.  She had mild ascites.  Her white count was 49,000 with bandemia.  She

was admitted to the ICU for sepsis, and started on empiric antibiotics.  Overnight,

she recovered and felt better.  Her white count improved to 30,000, which was her

baseline at the clinic the prior day.  She does receive Neulasta and on day of

treatment tends to be elevated in the upper 20,000 and 30,000.  The patient was seen

at bedside with her  present.  She denies any complaints at this time.  She

has transfer orders to the floor. 



PAST MEDICAL HISTORY:  

1. Stage IV adenocarcinoma of the colon with lung and liver mets.

2. History of right lower extremity DVT.

3. History of tobacco use.



PAST SURGICAL HISTORY:  None.



ALLERGIES:  NO KNOWN DRUG ALLERGIES.



HOME MEDICATIONS:  

1. Compazine.

2. Fentanyl.

3. Eliquis.

4. Hydrocodone.

5. Lasix.

6. Tramadol.

7. Varubi.

8. Zofran.



FAMILY HISTORY:  Brother with skin cancer.



SOCIAL HISTORY:  , has no children, 25 pack-year history of smoking.  Social

drinker.  No illicit drug use. 



REVIEW OF SYSTEMS:  A 10-point review of systems is positive for fatigue, otherwise

negative. 



PHYSICAL EXAMINATION:

VITAL SIGNS:  Temperature is 98.2, pulse is 72, respiratory rate 18, BP is 146/89. 

GENERAL:  This is a cachectic female, in no acute distress. 

HEENT:  Normocephalic, atraumatic.  Pupils are equal and reactive to light. 

NECK:  Supple. 

CV:  Regular rate and rhythm. 

LUNGS:  Clear. 

ABDOMEN:  Slightly distended.  Bowel sounds are positive. 

EXTREMITIES:  1+ bilateral lower extremity edema. 

SKIN:  No rash. 

HEMATOLOGICAL:  No petechiae or purpura. 

NEUROLOGICAL:  Nonfocal.



PERTINENT LABS AND X-RAYS:  Current WBCs are 30.1, hemoglobin 9.5, hematocrit 29.0,

platelet count is 107,000, 67% neutrophils, 28% bands, 3% lymphocytes.  Sodium 135,

potassium 4.2, chloride 103, CO2 is 22, BUN is 15, creatinine is 0.65, lactic acid

3.2, calcium 8.5, magnesium 2.1, bilirubin 0.7, AST is 54, ALT is 32, alkaline

phosphatase is 373.  Serum total protein 5.3, albumin 2.4, globulin 2.9.  Urine was

negative.  Radiology per HPI. 



ASSESSMENT:  

1. Presyncopal episode after chemotherapy.

2. Metastatic colon cancer, on chemo.



DISCUSSION:  The patient has improved over the last 24 hours.  She denies any issues

at this time other than fatigue.  She continues to wear the 5-FU pump, which should

be removed tomorrow around 3 p.m.  She will then get a Neulasta injection.  She has

been complaining over the last several weeks of increasing sacral pain. 



PLAN:  An MRI of the lumbar spine to rule out metastatic disease.  Her white count

has been elevated on the day of treatment secondary to Neulasta and should trend

down over the next several days.  Hopefully, she will be able to be discharged

tomorrow, to be seen in the clinic to remove the pump and get her Neulasta shot. 



Thank you for the consult.  We will follow along with her hospital course.







Job ID:  [id number]

## 2019-12-17 NOTE — PDOC.HOSPP
- Subjective


Encounter Date: 12/17/19


Encounter Time: 10:37


Subjective: 





Ms. Richards was seen today in follow-up of presyncope. She feel a bit tired, but 

otherwise ok.  She does not have any new complaint. She believes her WBC count 

may be elevated due to the " shot she was given to raise her WBC count.





- Objective


Vital Signs & Weight: 


 Vital Signs (12 hours)











  Temp Pulse Ox


 


 12/17/19 08:00  97.8 F  97


 


 12/17/19 04:00  98.2 F 


 


 12/17/19 00:00   96


 


 12/16/19 23:50  98.1 F 








 Weight











Weight                         101 lb 3.075 oz











 Most Recent Monitor Data











Heart Rate from ECG            71


 


NIBP                           125/74


 


NIBP BP-Mean                   91


 


Respiration from ECG           13


 


SpO2                           100














I&O: 


 











 12/16/19 12/17/19 12/18/19





 06:59 06:59 06:59


 


Intake Total  642 300


 


Output Total  800 150


 


Balance  -158 150











Result Diagrams: 


 12/17/19 03:36





 12/17/19 03:36





Hospitalist ROS





- Medication


Medications: 


Active Medications











Generic Name Dose Route Start Last Admin





  Trade Name Freq  PRN Reason Stop Dose Admin


 


Apixaban  2.5 mg  12/17/19 09:00  12/17/19 09:18





  Eliquis  PO   2.5 mg





  BID SUSAN   Administration





     





     





     





     


 


Famotidine  20 mg  12/17/19 09:00  12/17/19 09:18





  Pepcid  PO   20 mg





  BID SUSAN   Administration





     





     





     





     


 


Fentanyl  50 mcg  12/17/19 09:00  12/17/19 09:29





  Duragesic  TD   50 mcg





  Q3D SUSAN   Administration





     





     





     





     


 


Potassium Chloride 10 meq/  1,005 mls @ 100 mls/hr  12/16/19 21:30  12/17/19 00:

41





  Dextrose/Lactated Ringer's  IV   1,005 mls





  .Q10H3M SUSAN   Administration





     





     





     





     


 


Piperacillin Sod/Tazobactam  100 mls @ 200 mls/hr  12/17/19 02:00  12/17/19 09:

19





  Sod 2.25 gm/ Sodium Chloride  IVPB   100 mls





  0200,1000,1800 SUSAN   Administration





     





     





     





     


 


Nicotine  14 mg  12/16/19 22:00  12/17/19 00:42





  Nicoderm Patch  TD   Not Given





  Q24HR SUSAN   





     





     





     





     


 


Sodium Chloride  10 ml  12/17/19 09:00  12/17/19 09:19





  Flush - Normal Saline  IVF   10 ml





  Q12HR SUSAN   Administration





     





     





     





     














- Exam


Eye: PERRL


Heart: RRR, no murmur, no gallops, no rubs, normal peripheral pulses


Respiratory: CTAB, no wheezes, no rales, no ronchi, normal chest expansion


Gastrointestinal: soft, non-tender, non-distended, normal bowel sounds, no 

palpable masses


Extremities: no cyanosis, no clubbing, no edema





Hosp A/P


(1) Colon cancer metastasized to liver


Code(s): C18.9 - MALIGNANT NEOPLASM OF COLON, UNSPECIFIED; C78.7 - SECONDARY 

MALIG NEOPLASM OF LIVER AND INTRAHEPATIC BILE DUCT   Status: Acute   





(2) Leukocytosis


Code(s): D72.829 - ELEVATED WHITE BLOOD CELL COUNT, UNSPECIFIED   Status: Acute

   





(3) DVT (deep venous thrombosis)


Code(s): I82.409 - ACUTE EMBOLISM AND THOMBOS UNSP DEEP VN UNSP LOWER EXTREMITY

   Status: Acute

## 2019-12-18 LAB
HGB BLD-MCNC: 10.7 G/DL (ref 12–16)
MCH RBC QN AUTO: 32.5 PG (ref 27–31)
MCV RBC AUTO: 102 FL (ref 78–98)
MDIFF COMPLETE?: YES
PLATELET # BLD AUTO: 112 THOU/UL (ref 130–400)
POLYCHROMASIA BLD QL SMEAR: (no result) (100X)
RBC # BLD AUTO: 3.28 MILL/UL (ref 4.2–5.4)
TOXIC GRANULES BLD QL SMEAR: SLIGHT
VANCOMYCIN TROUGH SERPL-MCNC: 11.2 UG/ML
WBC # BLD AUTO: 28.4 THOU/UL (ref 4.8–10.8)

## 2019-12-18 RX ADMIN — Medication SCH ML: at 11:31

## 2019-12-18 RX ADMIN — Medication SCH ML: at 20:31

## 2019-12-18 RX ADMIN — PIPERACILLIN SODIUM, TAZOBACTAM SODIUM SCH MLS: 2; .25 INJECTION, POWDER, LYOPHILIZED, FOR SOLUTION INTRAVENOUS at 02:19

## 2019-12-18 RX ADMIN — DOCUSATE SODIUM 50 MG AND SENNOSIDES 8.6 MG SCH TAB: 8.6; 5 TABLET, FILM COATED ORAL at 20:31

## 2019-12-18 RX ADMIN — DOCUSATE SODIUM 50 MG AND SENNOSIDES 8.6 MG SCH TAB: 8.6; 5 TABLET, FILM COATED ORAL at 09:02

## 2019-12-18 RX ADMIN — PIPERACILLIN SODIUM, TAZOBACTAM SODIUM SCH MLS: 2; .25 INJECTION, POWDER, LYOPHILIZED, FOR SOLUTION INTRAVENOUS at 11:32

## 2019-12-18 RX ADMIN — VANCOMYCIN HYDROCHLORIDE SCH MLS: 750 INJECTION, POWDER, LYOPHILIZED, FOR SOLUTION INTRAVENOUS at 20:26

## 2019-12-18 RX ADMIN — PIPERACILLIN SODIUM, TAZOBACTAM SODIUM SCH MLS: 2; .25 INJECTION, POWDER, LYOPHILIZED, FOR SOLUTION INTRAVENOUS at 17:57

## 2019-12-18 NOTE — PDOC.HOSPP
- Subjective


Encounter Date: 12/18/19


Encounter Time: 14:09


Subjective: 





Ms. Richards was seen today in follow-up of generalized weakness. She says she 

still feels very weak. she is not sure if she is ready to go home yet.





- Objective


Vital Signs & Weight: 


 Vital Signs (12 hours)











  Temp Pulse Resp BP BP Pulse Ox


 


 12/18/19 12:17  98.4 F  83  18  178/89 H   95


 


 12/18/19 09:03   93    


 


 12/18/19 08:00       95


 


 12/18/19 07:50  98.0 F  93  18  132/85  173/100 H  95


 


 12/18/19 04:42  97.8 F  83  16  172/82 H   94 L


 


 12/18/19 02:24     173/88 H  








 Weight











Admit Weight                   104 lb 4.4 oz


 


Weight                         101 lb 3.04 oz











 Most Recent Monitor Data











Heart Rate from ECG            86


 


NIBP                           149/89


 


NIBP BP-Mean                   109


 


Respiration from ECG           13


 


SpO2                           87














I&O: 


 











 12/17/19 12/18/19 12/19/19





 06:59 06:59 06:59


 


Intake Total 642 1883 


 


Output Total 800 625 


 


Balance -158 1258 











Result Diagrams: 


 12/18/19 08:31





 12/17/19 03:36





Hospitalist ROS





- Medication


Medications: 


Active Medications











Generic Name Dose Route Start Last Admin





  Trade Name Freq  PRN Reason Stop Dose Admin


 


Amlodipine Besylate  5 mg  12/18/19 09:00  12/18/19 09:03





  Norvasc  PO   5 mg





  DAILY SUSAN   Administration





     





     





     





     


 


Apixaban  2.5 mg  12/17/19 09:00  12/18/19 09:03





  Eliquis  PO   2.5 mg





  BID SUSAN   Administration





     





     





     





     


 


Famotidine  20 mg  12/17/19 09:00  12/18/19 09:03





  Pepcid  PO   20 mg





  BID SUSAN   Administration





     





     





     





     


 


Fentanyl  50 mcg  12/17/19 09:00  12/17/19 09:29





  Duragesic  TD   50 mcg





  Q3D SUSAN   Administration





     





     





     





     


 


Piperacillin Sod/Tazobactam  100 mls @ 200 mls/hr  12/17/19 02:00  12/18/19 11:

32





  Sod 2.25 gm/ Sodium Chloride  IVPB   100 mls





  0200,1000,1800 SUSAN   Administration





     





     





     





     


 


Morphine Sulfate  2 mg  12/16/19 21:16  12/18/19 11:29





  Morphine  SLOW IVP   2 mg





  Q4H PRN   Administration





  Pain   





     





     





     


 


Nicotine  14 mg  12/16/19 22:00  12/17/19 21:02





  Nicoderm Patch  TD   Not Given





  Q24HR SUSAN   





     





     





     





     


 


Senna/Docusate Sodium  1 tab  12/17/19 21:00  12/18/19 09:02





  Senokot S  PO  12/19/19 09:01  1 tab





  BID SUSAN   Administration





     





     





     





     


 


Sodium Chloride  10 ml  12/17/19 09:00  12/18/19 11:31





  Flush - Normal Saline  IVF   10 ml





  Q12HR SUSAN   Administration





     





     





     





     














- Exam


Eye: PERRL


Heart: RRR, no murmur, no gallops, no rubs, normal peripheral pulses


Respiratory: CTAB, no wheezes, no rales, no ronchi, normal chest expansion


Gastrointestinal: soft, non-tender, non-distended, normal bowel sounds, no 

palpable masses, no hepatomegaly


Extremities: no cyanosis, no clubbing





Hosp A/P


(1) Colon cancer metastasized to liver


Code(s): C18.9 - MALIGNANT NEOPLASM OF COLON, UNSPECIFIED; C78.7 - SECONDARY 

MALIG NEOPLASM OF LIVER AND INTRAHEPATIC BILE DUCT   Status: Acute   





(2) Leukocytosis


Code(s): D72.829 - ELEVATED WHITE BLOOD CELL COUNT, UNSPECIFIED   Status: Acute

   





(3) DVT (deep venous thrombosis)


Code(s): I82.409 - ACUTE EMBOLISM AND THOMBOS UNSP DEEP VN UNSP LOWER EXTREMITY

   Status: Acute   





(4) Hypertension


Code(s): I10 - ESSENTIAL (PRIMARY) HYPERTENSION   Status: Acute   





- Plan





* Metastatic colon cancer- currently undergoing chemotherapy


* Leukocytosis- due to  Neulasta


* Cultures  are negative so far


* History of DVT- continue Eliquis


* Continue PT


* Will give some additional fluids


* HTN- ? new onset- will add Amlodipine scheduled, and Hydralazine as needed

## 2019-12-18 NOTE — PDOC.MOPN
Interval History: 





tired but otherwise back to normal.





- Vital Signs


Vital Signs: 


 Vital Signs (12 hours)











  Temp Pulse Resp BP BP BP Pulse Ox


 


 12/18/19 09:03   93     


 


 12/18/19 07:50  98.0 F  93  18   132/85  173/100 H  95


 


 12/18/19 04:42  97.8 F  83  16   172/82 H   94 L


 


 12/18/19 02:24      173/88 H  


 


 12/18/19 00:10  98.1 F  75  16   180/98 H   93 L


 


 12/18/19 00:02   77   189/98 H   








 Weight











Admit Weight                   104 lb 4.4 oz


 


Weight                         101 lb 3.04 oz











 Most Recent Monitor Data











Heart Rate from ECG            86


 


NIBP                           149/89


 


NIBP BP-Mean                   109


 


Respiration from ECG           13


 


SpO2                           87

















- Physical Exam


General: Alert, Oriented x3, No acute distress


HEENT: Atraumatic, PERRLA, EOMI, Mucous membr. moist/pink


Lungs: Clear to auscultation, Normal air movement


Cardiovascular: Regular rate, Normal S1, Normal S2, No murmurs, Gallops, Rubs


Abdomen: Other


Extremities: No clubbing, No cyanosis, No edema, Normal pulses, No tenderness/

swelling


Skin: No rashes, No breakdown, No significant lesion


Neurological: Normal gait, Normal speech, Strength at 5/5 X4 ext, Normal tone, 

Sensation intact, Cranial nerves 3-12 NL, Reflexes 2+





- Labs


Result Diagrams: 


 12/18/19 08:31





 12/17/19 03:36


Lab results: 


 Laboratory Results - last 24 hr





12/18/19 08:31: WBC 28.4 H, RBC 3.28 L, Hgb 10.7 L, Hct 33.5 L, .0 H, 

MCH 32.5 H, MCHC 31.8 L, RDW 17.9 H, Plt Count 112 L, MPV 8.6, Neutrophils % (

Manual) 47, Band Neuts % (Manual) 42 H, Lymphocytes % (Manual) 8 L, 

Metamyelocytes % (Man) 3 H, Neutrophils # Not Reportable, Lymphocytes # Not 

Reportable, Toxic Granulation SLIGHT, Plt Morphology Comment Appears Decreased L

, Polychromasia SLIGHT = 2-3 cells


12/18/19 08:31: Vancomycin Trough 11.2


12/18/19 06:00: Vitamin B12 Greater than  2000 H


12/18/19 06:00: Folate 18.90


12/16/19 18:02: Smear Path Review 








Status: lab reviewed by me





A/P





- Problem


(1) Colon cancer metastasized to liver


Current Visit: Yes   Code(s): C18.9 - MALIGNANT NEOPLASM OF COLON, UNSPECIFIED; 

C78.7 - SECONDARY MALIG NEOPLASM OF LIVER AND INTRAHEPATIC BILE DUCT   Status: 

Acute   





(2) Leukocytosis


Current Visit: Yes   Code(s): D72.829 - ELEVATED WHITE BLOOD CELL COUNT, 

UNSPECIFIED   Status: Acute   





(3) Sepsis


Current Visit: Yes   Code(s): A41.9 - SEPSIS, UNSPECIFIED ORGANISM   Status: 

Acute   





- Plan


Plan: 





Infusional pump removal this afternoon


no neulasta as she has leukocytosis


needs MRI lumbar sacrum, will order for this afternoon


Ok to dc from our prespective.

## 2019-12-19 RX ADMIN — VANCOMYCIN HYDROCHLORIDE SCH MLS: 750 INJECTION, POWDER, LYOPHILIZED, FOR SOLUTION INTRAVENOUS at 20:44

## 2019-12-19 RX ADMIN — PIPERACILLIN SODIUM, TAZOBACTAM SODIUM SCH: 2; .25 INJECTION, POWDER, LYOPHILIZED, FOR SOLUTION INTRAVENOUS at 18:00

## 2019-12-19 RX ADMIN — PIPERACILLIN SODIUM, TAZOBACTAM SODIUM SCH MLS: 2; .25 INJECTION, POWDER, LYOPHILIZED, FOR SOLUTION INTRAVENOUS at 02:07

## 2019-12-19 RX ADMIN — Medication SCH ML: at 20:41

## 2019-12-19 RX ADMIN — DOCUSATE SODIUM 50 MG AND SENNOSIDES 8.6 MG SCH TAB: 8.6; 5 TABLET, FILM COATED ORAL at 08:35

## 2019-12-19 RX ADMIN — Medication PRN ML: at 02:06

## 2019-12-19 RX ADMIN — VANCOMYCIN HYDROCHLORIDE SCH MLS: 750 INJECTION, POWDER, LYOPHILIZED, FOR SOLUTION INTRAVENOUS at 08:35

## 2019-12-19 RX ADMIN — Medication SCH ML: at 08:37

## 2019-12-19 RX ADMIN — PIPERACILLIN SODIUM, TAZOBACTAM SODIUM SCH MLS: 2; .25 INJECTION, POWDER, LYOPHILIZED, FOR SOLUTION INTRAVENOUS at 10:24

## 2019-12-19 RX ADMIN — PIPERACILLIN SODIUM, TAZOBACTAM SODIUM SCH MLS: 2; .25 INJECTION, POWDER, LYOPHILIZED, FOR SOLUTION INTRAVENOUS at 19:38

## 2019-12-19 NOTE — MRI
MRI LUMBAR SPINE WITH AND WITHOUT CONTRAST:



DATE:

12/19/2019



HISTORY:

64-year-old female with colon cancer metastatic to the liver presents with lumbosacral pain. Rule out
 lumbar metastatic disease.



COMPARISON:

none



TECHNIQUE:

Multiple sequences obtained in axial and sagittal planes, pre and post IV injection of gadolinium-bas
ed contrast agent.



FINDINGS:

Large number of metastatic liver masses are partially visualized. No spondylolisthesis.

There are 5 lumbar-type vertebrae. Vertebral body heights are maintained. There is a levoscoliosis.

There is diffusely homogeneously hypointense T1 signal of the bone marrow throughout all visualized o
sseous structures. This is nonspecific. Most commonly, this represents red marrow conversion. There

is a 2 x 1.5 x 1.5 cm Tarlov cyst at the left side of S2-3 in the sacrum. Conus medullaris terminates
 at L1-2. There is no abnormal enhancement or mass involving the intradural-extra medullary,

intramedullary, extradural, intraosseous, or perivertebral, soft tissues. There is mild to moderate d
isc space narrowing at L4-5. Mild disc space narrowing at L5-S1. Mild disc bulges at L4-5 and

L5-S1. Left lateral and far lateral broad-based shallow disc herniation with annular fissure at L3-4 
and L4-5. No high-grade neural foraminal stenosis or central spinal canal stenosis at any level.

Cauda equina is arranged in a symmetrical, normal distribution throughout the thecal sac.



IMPRESSION:



1. No evidence of metastatic disease involving the lumbar spine.

2. Lumbar levoscoliosis.

3. Mild degenerative disc disease at L4-5 and L5-S1.

4. Left lateral and far lateral broad-based disc herniations with annular fissures at L3-4 and L4-5 w
ithout nerve root impingement.

5. No central spinal canal stenosis or high-grade neural foraminal stenosis at any level.

6. Evidence for red marrow conversion.



Reported By: Chandrakant Bean 

Electronically Signed:  12/19/2019 8:30 AM

## 2019-12-19 NOTE — PDOC.HOSPP
- Subjective


Encounter Date: 12/19/19


Encounter Time: 16:19


Subjective: 





Ms. Richards was seen today in follow-up of generalized weakness and she also 

notes pain in her right thigh, and says that her skin hurts.





- Objective


Vital Signs & Weight: 


 Vital Signs (12 hours)











  Temp Pulse Resp BP BP BP Pulse Ox


 


 12/19/19 12:00  98.5 F  88  18   117/75   95


 


 12/19/19 08:49   85     


 


 12/19/19 08:00  98.6 F  86  18    156/83 H  96


 


 12/19/19 05:41      175/103 H  


 


 12/19/19 05:39   85    175/103 H  


 


 12/19/19 04:44     186/83 H   








 Weight











Admit Weight                   104 lb 4.4 oz


 


Weight                         101 lb 3.04 oz











 Most Recent Monitor Data











Heart Rate from ECG            86


 


NIBP                           149/89


 


NIBP BP-Mean                   109


 


Respiration from ECG           13


 


SpO2                           87














I&O: 


 











 12/18/19 12/19/19 12/20/19





 06:59 06:59 06:59


 


Intake Total 1883 2163 


 


Output Total 625  


 


Balance 1258 2163 











Result Diagrams: 


 12/18/19 08:31





 12/17/19 03:36





Hospitalist ROS





- Medication


Medications: 


Active Medications











Generic Name Dose Route Start Last Admin





  Trade Name Freq  PRN Reason Stop Dose Admin


 


Amlodipine Besylate  5 mg  12/18/19 09:00  12/19/19 08:49





  Norvasc  PO   5 mg





  DAILY SUSAN   Administration





     





     





     





     


 


Apixaban  2.5 mg  12/17/19 09:00  12/19/19 08:35





  Eliquis  PO   2.5 mg





  BID SUSAN   Administration





     





     





     





     


 


Bisacodyl  10 mg  12/16/19 21:13  12/18/19 20:29





  Dulcolax  PO   10 mg





  DAILYPRN PRN   Administration





  Constipation   





     





     





     


 


Clonidine  0.1 mg  12/19/19 00:18  12/19/19 04:44





  Catapres  PO   0.1 mg





  Q4H PRN   Administration





  SBP Greater Than 180   





     





     





     


 


Famotidine  20 mg  12/17/19 09:00  12/19/19 08:35





  Pepcid  PO   20 mg





  BID SUSAN   Administration





     





     





     





     


 


Fentanyl  50 mcg  12/17/19 09:00  12/17/19 09:29





  Duragesic  TD   50 mcg





  Q3D SUSAN   Administration





     





     





     





     


 


Hydralazine HCl  25 mg  12/18/19 08:16  12/18/19 20:30





  Apresoline  PO   25 mg





  TIDPRN PRN   Administration





  SBP Greater Than 170   





     





     





     


 


Piperacillin Sod/Tazobactam  100 mls @ 200 mls/hr  12/17/19 02:00  12/19/19 10:

24





  Sod 2.25 gm/ Sodium Chloride  IVPB   100 mls





  0200,1000,1800 SUSAN   Administration





     





     





     





     


 


Vancomycin HCl 750 mg/ Sodium  250 mls @ 250 mls/hr  12/18/19 21:00  12/19/19 08

:35





  Chloride  IVPB   250 mls





  Q12HR SUSAN   Administration





     





     





     





     


 


Morphine Sulfate  2 mg  12/16/19 21:16  12/18/19 11:29





  Morphine  SLOW IVP   2 mg





  Q4H PRN   Administration





  Pain   





     





     





     


 


Nicotine  14 mg  12/16/19 22:00  12/18/19 20:32





  Nicoderm Patch  TD   Not Given





  Q24HR SUSAN   





     





     





     





     


 


Sodium Chloride  10 ml  12/17/19 09:00  12/19/19 08:37





  Flush - Normal Saline  IVF   10 ml





  Q12HR SUSAN   Administration





     





     





     





     


 


Sodium Chloride  10 ml  12/16/19 22:06  12/19/19 02:06





  Flush - Normal Saline  IVF   10 ml





  PRN PRN   Administration





  Saline Flush   





     





     





     














- Exam


Eye: PERRL


Heart: RRR, no murmur, no gallops, no rubs


Respiratory: CTAB, no wheezes, no rales, no ronchi, normal chest expansion


Gastrointestinal: soft, normal bowel sounds, tender to palpation, distended


Extremities: no cyanosis, no clubbing, no edema





Hosp A/P


(1) Colon cancer metastasized to liver


Code(s): C18.9 - MALIGNANT NEOPLASM OF COLON, UNSPECIFIED; C78.7 - SECONDARY 

MALIG NEOPLASM OF LIVER AND INTRAHEPATIC BILE DUCT   Status: Acute   





(2) Leukocytosis


Code(s): D72.829 - ELEVATED WHITE BLOOD CELL COUNT, UNSPECIFIED   Status: Acute

   





(3) DVT (deep venous thrombosis)


Code(s): I82.409 - ACUTE EMBOLISM AND THOMBOS UNSP DEEP VN UNSP LOWER EXTREMITY

   Status: Acute   





(4) Hypertension


Code(s): I10 - ESSENTIAL (PRIMARY) HYPERTENSION   Status: Acute   





- Plan





* Metastatic colon cancer- currently undergoing chemotherapy


* Leukocytosis- due to  Neulasta


* Generalized pain- this is not well controlled- will add Gabapentin


* Her affect is flat, will add Duloxetine, which will hopefully help with pain 

as well


* Consult Palliative care- she may also need Home Palliative care as well


* History of DVT- continue Eliquis


* Continue PT


* HTN- blood pressure is better


* Keep in the hospital one more night

## 2019-12-19 NOTE — PQF
LINDA GRIFFIN TONI MD

[id number]                                                             [id number]

[id number]                             

                                   

CLINICAL DOCUMENTATION IMPROVEMENT CLARIFICATION FORM:  ICD-10 Updated



PLEASE DO AN ADDENDUM TO THE PROGRESS NOTE WITH ANY DOCUMENTATION UPDATES OR 
ADDITIONS AND CARRY THROUGH TO DC SUMMARY.   THANK YOU.



Date:                 12/19/2019   , 12/20/19 , 12/23/19                       
               ATTN:  DR. SAMI HAAS,  DR. CEASAR ESPINOZA



Please exercise your independent, professional judgment in responding to the 
clarification form. Clinical indicators are provided on the bottom of this form 
for your review.



Please check appropriate box(s):



[  ] Protein Calorie Malnutrition:    [  ] Mild      [  ] Moderate   [ x ] 
Severe   

[  ] Other Malnutrition (please specify) _______________________________________
__

[ x ] Cachexia 



[  ] Other diagnosis ___________

[  ] Unable to determine



In addition, please specify:

Present on Admission (POA):  [  x] Yes             [  ] No             [  ] 
Unable to determine



CLINICAL INDICATORS - SIGNS / SYMPTOMS / LABS    / RESULTS AND LOCATION IN MR



12/16 H&P ([facility]) PT REPORTS INTERMITTENT NAUSEA WITH VOMITING SHE 
EXPERIENCES AS USUAL POST CHEMO FATIGUE, SHE HAD A NEAR SYNCOPE WITH FEELINGS 
OF DIZZINESS. 



12/17 RD ASSESSMENT :  THE PATIENT WAS TRIGGERED FOR LOW BMI (18.4) , NAUSEA , 
VOMITING, REPORTS OF DECREASED APPETITE;  



--14-23 # WEIGHT LOSS ( 5.5%) OVER THE PAST 5 MONTHS



NUTRITION DIAGNOSIS - MALNUTRITION EVIDENCED BY : --MUSCLE WASTING TO TEMPLES, 
CLAVICLES AND SHOULDERS, FAT WASTING TO TRICEPS.



RISK:

DX COLON CANCER W METS TO LIVER (CHEMOTHERAPY) [facility] / PN ( 12/17)

GENERALIZED BODY PAIN (H&P/[facility]) 12/16



TREATMENTS:

DIETARY CONSULT (12/17)

RECOMMEND ENSURE ENLIVE TID (RD/12/17)



Moderate Malnutrition (in acute illness)

Energy Intake: <75% of estimated energy requirement for > 7 days

Weight Loss:  1-2%/1 week;  5%/ 1 month; 7.5%/3 months

Other: mild body fat loss; mild muscle mass loss; mild fluid accumulation; 



Severe Malnutrition (in acute illness)

Energy Intake: < 50% of estimated energy requirement for > 5 days

Weight Loss: >1-2%/1 week; >5%/1 month; >7.5%/3 months

Other: moderate body fat loss; moderate muscle mass loss; moderate- severe 
fluid accumulation; measurably reduced  strength



Moderate Malnutrition (in chronic illness)

Energy Intake: <75% of estimated energy requirement for >1 month

Weight Loss: 5%/1 month; 7.5%/3 months; 10%/6 months; 20%/1 year

Other: mild body fat loss; mild muscle mass loss; mild fluid accumulation



Severe Malnutrition (in chronic illness)

Energy Intake: <75% of estimated energy requirement for >1 month

Weight Loss: >5%/1 month; >7.5%/3 months; >10%/6 months; >20%/1 year

Other: severe body fat loss; severe muscle mass loss; severe fluid accumulation
; measurably reduced  strength





THANK YOU! CARI



(This form is maintained as a part of the permanent medical record)

 2015 [employer]  All Rights Reserved

[email or web address]    [phone]

                                                              

 

MTDD

## 2019-12-19 NOTE — PDOC.MOPN
Interval History: 





c/o dizziness after BM, swelling in knees.





- Vital Signs


Vital Signs: 


 Vital Signs (12 hours)











  Temp Pulse Resp BP BP BP Pulse Ox


 


 12/19/19 08:49   85     


 


 12/19/19 08:00  98.6 F  86  18    156/83 H  96


 


 12/19/19 05:41      175/103 H  


 


 12/19/19 05:39   85    175/103 H  


 


 12/19/19 04:44     186/83 H   


 


 12/19/19 03:37  97.6 F  78  16   186/103 H   98


 


 12/19/19 01:34   84    126/87  


 


 12/19/19 00:28     187/94 H   


 


 12/18/19 23:36  98.4 F  91  16   187/94 H   95








 Weight











Admit Weight                   104 lb 4.4 oz


 


Weight                         101 lb 3.04 oz











 Most Recent Monitor Data











Heart Rate from ECG            86


 


NIBP                           149/89


 


NIBP BP-Mean                   109


 


Respiration from ECG           13


 


SpO2                           87

















- Physical Exam


General: Alert, Oriented x3, No acute distress


HEENT: Atraumatic, PERRLA, EOMI, Mucous membr. moist/pink


Lungs: Clear to auscultation, Normal air movement


Cardiovascular: Regular rate, Normal S1, Normal S2, No murmurs, Gallops, Rubs


Abdomen: Other (distended)


Extremities: Other (1+ BLE edema)


Skin: No rashes, No breakdown, No significant lesion


Neurological: Normal speech


Psych/Mental Status: Mental status NL





- Labs


Result Diagrams: 


 12/18/19 08:31





 12/17/19 03:36


Lab results: 


 Laboratory Results - last 24 hr





12/18/19 08:31: Neutrophils % (Manual) 47, Band Neuts % (Manual) 42 H, 

Lymphocytes % (Manual) 8 L, Metamyelocytes % (Man) 3 H, Neutrophils # Not 

Reportable, Lymphocytes # Not Reportable, Toxic Granulation SLIGHT, Plt 

Morphology Comment Appears Decreased L, Polychromasia SLIGHT = 2-3 cells








Status: lab reviewed by me





A/P





- Problem


(1) Colon cancer metastasized to liver


Current Visit: Yes   Code(s): C18.9 - MALIGNANT NEOPLASM OF COLON, UNSPECIFIED; 

C78.7 - SECONDARY MALIG NEOPLASM OF LIVER AND INTRAHEPATIC BILE DUCT   Status: 

Acute   





(2) Leukocytosis


Current Visit: Yes   Code(s): D72.829 - ELEVATED WHITE BLOOD CELL COUNT, 

UNSPECIFIED   Status: Acute   





(3) Sepsis


Current Visit: Yes   Code(s): A41.9 - SEPSIS, UNSPECIFIED ORGANISM   Status: 

Acute   





- Plan


Plan: 





MRI results discussed


Continue stool softeners


OK to dc home from our perspective.


Follow-up 12/30 as scheduled.

## 2019-12-19 NOTE — MRI
EXAM:  MRI Pelvis W WO Con



DATE:  12/19/2019 3:30 PM



INDICATION:  Lumbosacral spinal pain and metastatic colon cancer



COMPARISON:  PET/CT dated October 17, 2019 and CT the abdomen and pelvis dated December 16, 2019



FINDING:  10 cc of MultiHance was utilized for the examination.



There is a T2 hyperintense, T1 hypointense heterogeneously enhancing lesion involving the left sacrum
 on image 24 series 4 measuring 2.7 cm. An additional focal lesion is seen involving the left iliac

brim measuring 1.4 cm suspicious for an additional metastatic lesion. There is a 5.7 mm lesion within
 the left ilium on image 21 of series 5. There is a 5 mm lesion involving the superior right

acetabulum on image 16 of series 5. There is a prominent Tarlov cyst involving the left S2 neural for
geovanny. There is moderate free fluid seen within the abdomen and pelvis. There are numerous

metastatic lesions involving the liver, better detailed on a recently performed CT the abdomen and pe
lvis with contrast. There is a moderate gallbladder wall edema. There are numerous fibroids within

the uterus. There is a prominent amount retained stool within the colon. There is prominent diffuse a
nasarca. No pathologic fractures identified.





IMPRESSION:

1. Scattered osseous metastatic disease without evidence of pathologic fracture.

2. Moderate ascites and prominent anasarca.

3. Hepatic metastatic disease. Gallbladder wall thickening likely reflects lymphedema from the extent
 of the hepatic metastatic disease present.

4. Fibroid uterus.



Reported By: Jama Simeon 

Electronically Signed:  12/19/2019 8:19 AM

## 2019-12-20 LAB — VANCOMYCIN TROUGH SERPL-MCNC: 16 UG/ML

## 2019-12-20 RX ADMIN — Medication SCH ML: at 21:22

## 2019-12-20 RX ADMIN — PIPERACILLIN SODIUM, TAZOBACTAM SODIUM SCH MLS: 2; .25 INJECTION, POWDER, LYOPHILIZED, FOR SOLUTION INTRAVENOUS at 17:23

## 2019-12-20 RX ADMIN — VANCOMYCIN HYDROCHLORIDE SCH MLS: 750 INJECTION, POWDER, LYOPHILIZED, FOR SOLUTION INTRAVENOUS at 09:21

## 2019-12-20 RX ADMIN — Medication SCH ML: at 09:22

## 2019-12-20 RX ADMIN — PIPERACILLIN SODIUM, TAZOBACTAM SODIUM SCH MLS: 2; .25 INJECTION, POWDER, LYOPHILIZED, FOR SOLUTION INTRAVENOUS at 10:52

## 2019-12-20 RX ADMIN — Medication SCH SPR: at 21:20

## 2019-12-20 RX ADMIN — PIPERACILLIN SODIUM, TAZOBACTAM SODIUM SCH MLS: 2; .25 INJECTION, POWDER, LYOPHILIZED, FOR SOLUTION INTRAVENOUS at 01:47

## 2019-12-20 RX ADMIN — VANCOMYCIN HYDROCHLORIDE SCH MLS: 750 INJECTION, POWDER, LYOPHILIZED, FOR SOLUTION INTRAVENOUS at 21:23

## 2019-12-20 RX ADMIN — ONDANSETRON PRN MG: 2 INJECTION INTRAMUSCULAR; INTRAVENOUS at 12:46

## 2019-12-20 RX ADMIN — Medication PRN ML: at 01:47

## 2019-12-20 NOTE — PDOC.HOSPP
- Subjective


Encounter Date: 12/20/19


Encounter Time: 12:01


Subjective: 





Ms. Richards was seen today in follow-up. She is complaining of severe abdominal 

pain. She has begun to vomit this morning. She is ill appearing.





- Objective


Vital Signs & Weight: 


 Vital Signs (12 hours)











  Temp Pulse Resp BP BP Pulse Ox


 


 12/20/19 11:28   85    148/81 H 


 


 12/20/19 09:20   82   177/88 H  


 


 12/20/19 08:00  98.5 F  82  18   177/88 H  96


 


 12/20/19 03:07      160/89 H 


 


 12/20/19 01:53      138/80 








 Weight











Admit Weight                   104 lb 4.4 oz


 


Weight                         101 lb 3.04 oz











 Most Recent Monitor Data











Heart Rate from ECG            86


 


NIBP                           149/89


 


NIBP BP-Mean                   109


 


Respiration from ECG           13


 


SpO2                           87














I&O: 


 











 12/19/19 12/20/19 12/21/19





 06:59 06:59 06:59


 


Intake Total 2163 1000 


 


Balance 2163 1000 











Result Diagrams: 


 12/18/19 08:31





 12/17/19 03:36





Hospitalist ROS





- Medication


Medications: 


Active Medications











Generic Name Dose Route Start Last Admin





  Trade Name Freq  PRN Reason Stop Dose Admin


 


Hydrocodone Bitart/Acetaminophen  1 tab  12/18/19 15:40  12/20/19 09:19





  [place] 5/325  PO   1 tab





  Q4H PRN   Administration





  Pain   





     





     





     


 


Amlodipine Besylate  5 mg  12/18/19 09:00  12/20/19 09:20





  Norvasc  PO   5 mg





  DAILY SUSAN   Administration





     





     





     





     


 


Apixaban  2.5 mg  12/17/19 09:00  12/20/19 09:21





  Eliquis  PO   2.5 mg





  BID SUSAN   Administration





     





     





     





     


 


Bisacodyl  10 mg  12/16/19 21:13  12/19/19 20:37





  Dulcolax  PO   10 mg





  DAILYPRN PRN   Administration





  Constipation   





     





     





     


 


Clonidine  0.1 mg  12/19/19 00:18  12/19/19 04:44





  Catapres  PO   0.1 mg





  Q4H PRN   Administration





  SBP Greater Than 180   





     





     





     


 


Duloxetine HCl  30 mg  12/20/19 09:00  12/20/19 09:21





  Cymbalta  PO   30 mg





  DAILY SUSAN   Administration





     





     





     





     


 


Famotidine  20 mg  12/17/19 09:00  12/20/19 09:20





  Pepcid  PO   20 mg





  BID SUSAN   Administration





     





     





     





     


 


Fentanyl  50 mcg  12/17/19 09:00  12/20/19 10:03





  Duragesic  TD   50 mcg





  Q3D SUSAN   Administration





     





     





     





     


 


Gabapentin  100 mg  12/19/19 21:00  12/19/19 20:37





  Neurontin  PO   100 mg





  HS SUSAN   Administration





     





     





     





     


 


Hydralazine HCl  25 mg  12/18/19 08:16  12/20/19 00:00





  Apresoline  PO   25 mg





  TIDPRN PRN   Administration





  SBP Greater Than 170   





     





     





     


 


Piperacillin Sod/Tazobactam  100 mls @ 200 mls/hr  12/17/19 02:00  12/20/19 10:

52





  Sod 2.25 gm/ Sodium Chloride  IVPB   100 mls





  0200,1000,1800 SUSAN   Administration





     





     





     





     


 


Vancomycin HCl 750 mg/ Sodium  250 mls @ 250 mls/hr  12/18/19 21:00  12/20/19 09

:21





  Chloride  IVPB   250 mls





  Q12HR SUSAN   Administration





     





     





     





     


 


Morphine Sulfate  2 mg  12/16/19 21:16  12/20/19 11:31





  Morphine  SLOW IVP   2 mg





  Q4H PRN   Administration





  Pain   





     





     





     


 


Nicotine  14 mg  12/16/19 22:00  12/19/19 20:25





  Nicoderm Patch  TD   Not Given





  Q24HR SUSAN   





     





     





     





     


 


Sodium Chloride  10 ml  12/17/19 09:00  12/20/19 09:22





  Flush - Normal Saline  IVF   10 ml





  Q12HR SUSAN   Administration





     





     





     





     


 


Sodium Chloride  10 ml  12/16/19 22:06  12/20/19 01:47





  Flush - Normal Saline  IVF   10 ml





  PRN PRN   Administration





  Saline Flush   





     





     





     














- Exam


Eye: PERRL


Heart: RRR, no murmur, no gallops


Respiratory: CTAB, no wheezes, no rales, no ronchi, normal chest expansion, no 

tachypnea


Gastrointestinal: distended (tender to palpation)


Extremities: 1+ LE edema





Hosp A/P


(1) Colon cancer metastasized to liver


Code(s): C18.9 - MALIGNANT NEOPLASM OF COLON, UNSPECIFIED; C78.7 - SECONDARY 

MALIG NEOPLASM OF LIVER AND INTRAHEPATIC BILE DUCT   Status: Acute   





(2) Leukocytosis


Code(s): D72.829 - ELEVATED WHITE BLOOD CELL COUNT, UNSPECIFIED   Status: Acute

   





(3) DVT (deep venous thrombosis)


Code(s): I82.409 - ACUTE EMBOLISM AND THOMBOS UNSP DEEP VN UNSP LOWER EXTREMITY

   Status: Acute   





(4) Hypertension


Code(s): I10 - ESSENTIAL (PRIMARY) HYPERTENSION   Status: Acute   





- Plan





* Metastatic colon cancer- currently undergoing chemotherapy


* Abdominal pain- she is much more distended today- concerned she has become 

obstructed- will check an X-ray of the abdomen- I do not believe she can stand 

for an upright film


* Will consult Surgery


* Leukocytosis- due to  Neulasta


* Consult Palliative care


* History of DVT- will likely need to change this to Lovenox since she is not 

able to eat- will await Surgery evaluation as well


* HTN- blood pressure is a bit labile


* Better symptom management


* Discussed with Dr. Pimentel, and discussed with Luz from Oncology

## 2019-12-20 NOTE — PDOC.MOPN
Interval History: 





c/o abdominal pain, especially with palpation. + nausea.





- Vital Signs


Vital Signs: 


 Vital Signs (12 hours)











  Temp Pulse Resp BP BP Pulse Ox


 


 12/20/19 12:00  98.4 F  85  18    95


 


 12/20/19 11:28   85    148/81 H 


 


 12/20/19 09:20   82   177/88 H  


 


 12/20/19 08:00  98.5 F  82  18   177/88 H  95


 


 12/20/19 03:07      160/89 H 


 


 12/20/19 01:53      138/80 








 Weight











Admit Weight                   104 lb 4.4 oz


 


Weight                         101 lb 3.04 oz











 Most Recent Monitor Data











Heart Rate from ECG            86


 


NIBP                           149/89


 


NIBP BP-Mean                   109


 


Respiration from ECG           13


 


SpO2                           87

















- Physical Exam


General: Alert, Oriented x3, No acute distress


HEENT: Atraumatic, PERRLA, EOMI, Mucous membr. moist/pink


Lungs: Clear to auscultation, Normal air movement


Cardiovascular: Regular rate, Normal S1, Normal S2, No murmurs, Gallops, Rubs


Abdomen: Other (distended, tender)


Extremities: No clubbing, No cyanosis, No edema, Normal pulses, No tenderness/

swelling


Skin: No rashes, No breakdown, No significant lesion


Neurological: Normal speech





- Labs


Result Diagrams: 


 12/18/19 08:31





 12/17/19 03:36


Lab results: 


 Laboratory Results - last 24 hr





12/20/19 08:16: Vancomycin Trough 16.0








Status: lab reviewed by me





- Radiology Interpretation


  ** Other


Additional Comment: 





no obstruction on abdominal xrays





A/P





- Problem


(1) Colon cancer metastasized to liver


Current Visit: Yes   Code(s): C18.9 - MALIGNANT NEOPLASM OF COLON, UNSPECIFIED; 

C78.7 - SECONDARY MALIG NEOPLASM OF LIVER AND INTRAHEPATIC BILE DUCT   Status: 

Acute   





(2) Leukocytosis


Current Visit: Yes   Code(s): D72.829 - ELEVATED WHITE BLOOD CELL COUNT, 

UNSPECIFIED   Status: Acute   





(3) Sepsis


Current Visit: Yes   Code(s): A41.9 - SEPSIS, UNSPECIFIED ORGANISM   Status: 

Acute   





- Plan


Plan: 





Laxatives per [place], appreciate assistance.


continue pain control

## 2019-12-20 NOTE — PDOC.PALCO
Palliative Care Consult





- Consult Details


Requesting Physician: Dr Stewart


Reason for Consult: symptom management


Family Members Present: None





- Pertinent HPI





64 year old female with colon cancer with liver and lung mets. Patient was 

receiving outpatient chemo and experienced a syncopal episode 12/16 and was 

transferred to the emergency room.  Initially placed in the critical care unit, 

then transferred to oncology. Patient has had continued abdominal pain, with 

intermittent nausea and is constipated. 





- Pertinent PMH





Colon cancer with liver metastasis, lower ext DVT on anticoagulation





- Social History


Smoking Status: Former smoker


Smoking: greater than 1 pack/day


Alcohol Use: none


Drug Use History: none


Living Situation: 





- Medications


MAR Reviewed: Yes





- Allergies


Allergies/Adverse Reactions: 


 Allergies











Allergy/AdvReac Type Severity Reaction Status Date / Time


 


No Known Allergies Allergy   Verified 12/17/19 00:13














- Subjective





Initially patient was unable to participate in assessment secondary to 

abdominal pain, given IV morphine and Dr Stewart ordered an abdominal x-ray for 

evaluation of bowel obstruction. X-ray was negative and patient pain, although 

always present, returned to her baseline. 





- ROS


Constitutional: alert, loss appetite, weakness


Eyes: other (negative for blurry vision or visual disturbances)


ENT: dry mouth


Respiratory: other (negative for cough, shortness of breath)


Gastrointestinal: abdominal pain, constipation, nausea


Neurological: dizziness, weakness





- Objective


Vital Signs: 


 Vital Signs - Most Recent











Temp Pulse Resp BP Pulse Ox


 


 98.4 F   85   18   148/81 H  95 


 


 12/20/19 12:00  12/20/19 12:00  12/20/19 12:00  12/20/19 11:28  12/20/19 12:00











Palliative Performance Scale: 40





- Physical Exam


Constitutional: ill appearing, mild distress


HEENT: EOMI, moist MMs


Respiratory: no wheezing


Cardiovascular: RRR


Gastrointestinal: hypoactive bowel sounds


Deviation from normal: Distended, tender but at baseline. 


Neurology: moves all 4 limbs


Skin: cap refill <2 seconds, normal turgor


Psychiatric: A&O x 3





- Problem List


(1) Palliative care encounter


Code(s): Z51.5 - ENCOUNTER FOR PALLIATIVE CARE   Current Visit: Yes   Status: 

Acute   





(2) Pain


Code(s): R52 - PAIN, UNSPECIFIED   Current Visit: Yes   Status: Acute   





(3) Colon cancer metastasized to liver


Code(s): C18.9 - MALIGNANT NEOPLASM OF COLON, UNSPECIFIED; C78.7 - SECONDARY 

MALIG NEOPLASM OF LIVER AND INTRAHEPATIC BILE DUCT   Current Visit: Yes   Status

: Acute   





(4) Lung metastases


Code(s): C78.00 - SECONDARY MALIGNANT NEOPLASM OF UNSPECIFIED LUNG   Current 

Visit: No   Status: Acute   





- Plan/Recommendations


Plan: 


Increase Fentanyl Patch


*Teaching to utilize IV morphine to mange pain and reduce pain threshold


*Just received supp for constipation


*May consider Lactulose, however patient nauseated and will need to mitigate 

nausea


*Teaching to request more frequently Zofran or Phenergan for nausea


*Non pharmacologic measures to mitigate nausea such as avoiding hot foods but 

select cold or room temp, small meals, sour things such as lemon drops, good 

oral hygiene


*Biotene for dry mouth























[60] minutes spent on this encounter with >50% of the time in counseling and 

coordination of care.





Thank you for this very appropriate consult.

## 2019-12-20 NOTE — PDOC.EVN
Event Note





- Event Note


Event Note: 





Abdominal films are negative for obstruction. Will cancel the Surgery Consult. 

Will give a trial of Dulcolax, and see if this helps with her symptoms.

## 2019-12-20 NOTE — RAD
ABDOMEN TWO VIEWS:

 

HISTORY:

Abdominal pain.

 

TECHNIQUE:

Portable views obtained supine and left lateral decubitus.

 

FINDINGS:

Gaseous distention of the stomach. Scattered stool and gas in the colon. Small bowel gas pattern is u
nremarkable. No evidence of free intraperitoneal air.

 

IMPRESSION:

Unremarkable bowel gas pattern.

 

POS: [facility]

## 2019-12-21 RX ADMIN — ONDANSETRON PRN MG: 2 INJECTION INTRAMUSCULAR; INTRAVENOUS at 15:16

## 2019-12-21 RX ADMIN — Medication SCH SPR: at 09:12

## 2019-12-21 RX ADMIN — Medication SCH SPR: at 18:04

## 2019-12-21 RX ADMIN — PIPERACILLIN SODIUM, TAZOBACTAM SODIUM SCH MLS: 2; .25 INJECTION, POWDER, LYOPHILIZED, FOR SOLUTION INTRAVENOUS at 10:18

## 2019-12-21 RX ADMIN — Medication SCH ML: at 20:03

## 2019-12-21 RX ADMIN — PIPERACILLIN SODIUM, TAZOBACTAM SODIUM SCH MLS: 2; .25 INJECTION, POWDER, LYOPHILIZED, FOR SOLUTION INTRAVENOUS at 02:17

## 2019-12-21 RX ADMIN — Medication SCH ML: at 09:10

## 2019-12-21 RX ADMIN — VANCOMYCIN HYDROCHLORIDE SCH MLS: 750 INJECTION, POWDER, LYOPHILIZED, FOR SOLUTION INTRAVENOUS at 09:11

## 2019-12-21 NOTE — PDOC.HOSPP
- Subjective


Encounter Date: 12/21/19


Encounter Time: 12:00


Subjective: 





nausea is better


had bm yesterday


 at bedside


pain is better


has ambulated with PT yesterday





- Objective


Vital Signs & Weight: 


 Vital Signs (12 hours)











  Temp Pulse Resp BP BP Pulse Ox


 


 12/21/19 09:09   72   172/98 H  


 


 12/21/19 08:00  98.2 F  72  18   172/98 H  95


 


 12/21/19 05:05      169/71 H 








 Weight











Admit Weight                   104 lb 4.4 oz


 


Weight                         101 lb 3.04 oz











 Most Recent Monitor Data











Heart Rate from ECG            86


 


NIBP                           149/89


 


NIBP BP-Mean                   109


 


Respiration from ECG           13


 


SpO2                           87














I&O: 


 











 12/20/19 12/21/19 12/22/19





 06:59 06:59 06:59


 


Intake Total 1000 1739 


 


Output Total  300 


 


Balance 1000 1439 











Result Diagrams: 


 12/18/19 08:31





 12/17/19 03:36





Hospitalist ROS





- Medication


Medications: 


Active Medications











Generic Name Dose Route Start Last Admin





  Trade Name Freq  PRN Reason Stop Dose Admin


 


Hydrocodone Bitart/Acetaminophen  1 tab  12/18/19 15:40  12/20/19 09:19





  [place] 5/325  PO   1 tab





  Q4H PRN   Administration





  Pain   





     





     





     


 


Amlodipine Besylate  5 mg  12/18/19 09:00  12/21/19 09:09





  Norvasc  PO   5 mg





  DAILY SSUAN   Administration





     





     





     





     


 


Apixaban  2.5 mg  12/17/19 09:00  12/21/19 09:10





  Eliquis  PO   2.5 mg





  BID SUSAN   Administration





     





     





     





     


 


Bisacodyl  10 mg  12/16/19 21:13  12/21/19 10:20





  Dulcolax  PO   10 mg





  DAILYPRN PRN   Administration





  Constipation   





     





     





     


 


Bisacodyl  10 mg  12/20/19 13:12  12/20/19 13:32





  Dulcolax  [place]   10 mg





  DAILYPRN PRN   Administration





  Constipation   





     





     





     


 


Clonidine  0.1 mg  12/19/19 00:18  12/20/19 19:10





  Catapres  PO   0.1 mg





  Q4H PRN   Administration





  SBP Greater Than 180   





     





     





     


 


Duloxetine HCl  30 mg  12/20/19 09:00  12/21/19 09:10





  Cymbalta  PO   30 mg





  DAILY SUSAN   Administration





     





     





     





     


 


Famotidine  20 mg  12/17/19 09:00  12/21/19 09:09





  Pepcid  PO   20 mg





  BID SUSAN   Administration





     





     





     





     


 


Fentanyl  75 mcg  12/20/19 17:00  12/20/19 17:24





  Duragesic  TD   75 mcg





  Q3D SUSAN   Administration





     





     





     





     


 


Gabapentin  100 mg  12/19/19 21:00  12/20/19 21:22





  Neurontin  PO   100 mg





  HS SUSAN   Administration





     





     





     





     


 


Hydralazine HCl  25 mg  12/18/19 08:16  12/20/19 00:00





  Apresoline  PO   25 mg





  TIDPRN PRN   Administration





  SBP Greater Than 170   





     





     





     


 


Piperacillin Sod/Tazobactam  100 mls @ 200 mls/hr  12/17/19 02:00  12/21/19 10:

18





  Sod 2.25 gm/ Sodium Chloride  IVPB   100 mls





  0200,1000,1800 SUSAN   Administration





     





     





     





     


 


Vancomycin HCl 750 mg/ Sodium  250 mls @ 250 mls/hr  12/18/19 21:00  12/21/19 09

:11





  Chloride  IVPB   250 mls





  Q12HR SUSAN   Administration





     





     





     





     


 


Promethazine HCl 25 mg/ Sodium  51 mls @ 204 mls/hr  12/20/19 12:12  12/20/19 17

:59





  Chloride  IVPB   51 mls





  Q6H PRN   Administration





  Nausea/Vomiting   





     





     





     


 


Miscellaneous Medication  0 ml  12/20/19 18:00  12/21/19 09:12





  Biotene Moisturizing Mouth  MM   1 spr





  BID-PC SUSAN   Administration





     





     





     





     


 


Morphine Sulfate  2 mg  12/16/19 21:16  12/20/19 11:31





  Morphine  SLOW IVP   2 mg





  Q4H PRN   Administration





  Pain   





     





     





     


 


Nicotine  14 mg  12/16/19 22:00  12/20/19 22:53





  Nicoderm Patch  TD   Not Given





  Q24HR [facility]   





     





     





     





     


 


Ondansetron HCl  4 mg  12/16/19 21:13  12/20/19 12:46





  Zofran  IVP   4 mg





  Q6H PRN   Administration





  Nausea/Vomiting   





     





     





     


 


Sodium Chloride  10 ml  12/17/19 09:00  12/21/19 09:10





  Flush - Normal Saline  IVF   10 ml





  Q12HR SUSAN   Administration





     





     





     





     


 


Sodium Chloride  10 ml  12/16/19 22:06  12/20/19 01:47





  Flush - Normal Saline  IVF   10 ml





  PRN PRN   Administration





  Saline Flush   





     





     





     














- Exam


General Appearance: awake alert


Eye: PERRL, anicteric sclera


ENT: no oropharyngeal lesions, moist mucosa


Neck: supple, no thyromegaly


Heart: RRR, no murmur


Respiratory: no wheezes, no rales


Gastrointestinal: soft, distended


Extremities: no cyanosis, no edema


Neurological: cranial nerve grossly intact, no focal deficits


Psychiatric: normal affect, A&O x 3





Hosp A/P


(1) Colon cancer metastasized to liver


Code(s): C18.9 - MALIGNANT NEOPLASM OF COLON, UNSPECIFIED; C78.7 - SECONDARY 

MALIG NEOPLASM OF LIVER AND INTRAHEPATIC BILE DUCT   Status: Chronic   





(2) Hypertension


Code(s): I10 - ESSENTIAL (PRIMARY) HYPERTENSION   Status: Chronic   


Qualifiers: 


   Hypertension type: essential hypertension   Qualified Code(s): I10 - 

Essential (primary) hypertension   





(3) Leukocytosis


Code(s): D72.829 - ELEVATED WHITE BLOOD CELL COUNT, UNSPECIFIED   Status: Acute

   





(4) DVT (deep venous thrombosis)


Code(s): I82.409 - ACUTE EMBOLISM AND THOMBOS UNSP DEEP VN UNSP LOWER EXTREMITY

   Status: Chronic   


Qualifiers: 


   DVT location: lower extremity   Chronicity: chronic 





(5) Lung metastases


Code(s): C78.00 - SECONDARY MALIGNANT NEOPLASM OF UNSPECIFIED LUNG   Status: 

Chronic   





- Plan





abdominal pain with liver mets and ascites


pt has innumerable liver mets


still getting chemotherapy


poor prognosis, palliative care for hospice eval if ok with Oncology


to amb as tolerated


on fentanyl 75mcg tts, morphine and narco prn


may dc antibiotics, blood cs x2 -ve


got neulasta, likely reason for elevated wbc's, last chemo was on Monday


continue eliquis, norvasc


hemostable

## 2019-12-22 LAB
ALBUMIN SERPL BCG-MCNC: 2.4 G/DL (ref 3.4–4.8)
ALP SERPL-CCNC: 361 U/L (ref 40–110)
ALT SERPL W P-5'-P-CCNC: 18 U/L (ref 8–55)
ANION GAP SERPL CALC-SCNC: 9 MMOL/L (ref 10–20)
AST SERPL-CCNC: 21 U/L (ref 5–34)
BASOPHILS # BLD AUTO: 0.1 THOU/UL (ref 0–0.2)
BASOPHILS NFR BLD AUTO: 1.3 % (ref 0–1)
BILIRUB SERPL-MCNC: 0.5 MG/DL (ref 0.2–1.2)
BUN SERPL-MCNC: 17 MG/DL (ref 9.8–20.1)
CALCIUM SERPL-MCNC: 9.2 MG/DL (ref 7.8–10.44)
CHLORIDE SERPL-SCNC: 106 MMOL/L (ref 98–107)
CO2 SERPL-SCNC: 28 MMOL/L (ref 23–31)
CREAT CL PREDICTED SERPL C-G-VRATE: 66 ML/MIN (ref 70–130)
EOSINOPHIL # BLD AUTO: 0 THOU/UL (ref 0–0.7)
EOSINOPHIL NFR BLD AUTO: 0.8 % (ref 0–10)
GLOBULIN SER CALC-MCNC: 2.9 G/DL (ref 2.4–3.5)
GLUCOSE SERPL-MCNC: 85 MG/DL (ref 80–115)
HGB BLD-MCNC: 9.6 G/DL (ref 12–16)
LYMPHOCYTES # BLD: 1.6 THOU/UL (ref 1.2–3.4)
LYMPHOCYTES NFR BLD AUTO: 26 % (ref 21–51)
MCH RBC QN AUTO: 32.7 PG (ref 27–31)
MCV RBC AUTO: 100 FL (ref 78–98)
MONOCYTES # BLD AUTO: 0.1 THOU/UL (ref 0.11–0.59)
MONOCYTES NFR BLD AUTO: 0.8 % (ref 0–10)
NEUTROPHILS # BLD AUTO: 4.4 THOU/UL (ref 1.4–6.5)
NEUTROPHILS NFR BLD AUTO: 71 % (ref 42–75)
PLATELET # BLD AUTO: 83 THOU/UL (ref 130–400)
POTASSIUM SERPL-SCNC: 3.7 MMOL/L (ref 3.5–5.1)
RBC # BLD AUTO: 2.92 MILL/UL (ref 4.2–5.4)
SODIUM SERPL-SCNC: 139 MMOL/L (ref 136–145)
WBC # BLD AUTO: 6.1 THOU/UL (ref 4.8–10.8)

## 2019-12-22 RX ADMIN — Medication SCH ML: at 20:13

## 2019-12-22 RX ADMIN — Medication SCH ML: at 09:35

## 2019-12-22 RX ADMIN — Medication SCH SPR: at 17:34

## 2019-12-22 RX ADMIN — Medication SCH SPR: at 09:34

## 2019-12-22 NOTE — PDOC.HOSPP
- Subjective


Encounter Date: 12/22/19


Encounter Time: 11:30


Subjective: 





feels better, no nausea





- Objective


Vital Signs & Weight: 


 Vital Signs (12 hours)











  Temp Pulse Resp BP BP Pulse Ox


 


 12/22/19 09:33   75   166/69 H  


 


 12/22/19 08:00  98.1 F  75  18   166/69 H  90 L


 


 12/22/19 04:11  98.0 F  70  16   138/82  94 L








 Weight











Admit Weight                   104 lb 4.4 oz


 


Weight                         101 lb 3.04 oz











 Most Recent Monitor Data











Heart Rate from ECG            86


 


NIBP                           149/89


 


NIBP BP-Mean                   109


 


Respiration from ECG           13


 


SpO2                           87














I&O: 


 











 12/21/19 12/22/19 12/23/19





 06:59 06:59 06:59


 


Intake Total 1739 1020 


 


Output Total 300 250 


 


Balance 1439 770 











Result Diagrams: 


 12/22/19 08:16





 12/22/19 08:16





Hospitalist ROS





- Medication


Medications: 


Active Medications











Generic Name Dose Route Start Last Admin





  Trade Name Freq  PRN Reason Stop Dose Admin


 


Hydrocodone Bitart/Acetaminophen  1 tab  12/18/19 15:40  12/20/19 09:19





  [place] 5/325  PO   1 tab





  Q4H PRN   Administration





  Pain   





     





     





     


 


Amlodipine Besylate  5 mg  12/18/19 09:00  12/22/19 09:33





  Norvasc  PO   5 mg





  DAILY SUSAN   Administration





     





     





     





     


 


Apixaban  2.5 mg  12/17/19 09:00  12/22/19 09:33





  Eliquis  PO   2.5 mg





  BID SUSAN   Administration





     





     





     





     


 


Bisacodyl  10 mg  12/16/19 21:13  12/21/19 10:20





  Dulcolax  PO   10 mg





  DAILYPRN PRN   Administration





  Constipation   





     





     





     


 


Bisacodyl  10 mg  12/20/19 13:12  12/20/19 13:32





  Dulcolax  [place]   10 mg





  DAILYPRN PRN   Administration





  Constipation   





     





     





     


 


Clonidine  0.1 mg  12/19/19 00:18  12/21/19 16:57





  Catapres  PO   0.1 mg





  Q4H PRN   Administration





  SBP Greater Than 180   





     





     





     


 


Duloxetine HCl  30 mg  12/20/19 09:00  12/22/19 09:33





  Cymbalta  PO   30 mg





  DAILY SUSAN   Administration





     





     





     





     


 


Famotidine  20 mg  12/17/19 09:00  12/22/19 09:33





  Pepcid  PO   20 mg





  BID SUSAN   Administration





     





     





     





     


 


Fentanyl  75 mcg  12/20/19 17:00  12/20/19 17:24





  Duragesic  TD   75 mcg





  Q3D SUSAN   Administration





     





     





     





     


 


Gabapentin  100 mg  12/19/19 21:00  12/21/19 20:02





  Neurontin  PO   100 mg





  HS SUSAN   Administration





     





     





     





     


 


Hydralazine HCl  25 mg  12/18/19 08:16  12/20/19 00:00





  Apresoline  PO   25 mg





  TIDPRN PRN   Administration





  SBP Greater Than 170   





     





     





     


 


Promethazine HCl 25 mg/ Sodium  51 mls @ 204 mls/hr  12/20/19 12:12  12/20/19 17

:59





  Chloride  IVPB   51 mls





  Q6H PRN   Administration





  Nausea/Vomiting   





     





     





     


 


Miscellaneous Medication  0 ml  12/20/19 18:00  12/22/19 09:34





  Biotene Moisturizing Mouth  MM   1 spr





  BID-PC SUSAN   Administration





     





     





     





     


 


Morphine Sulfate  2 mg  12/16/19 21:16  12/20/19 11:31





  Morphine  SLOW IVP   2 mg





  Q4H PRN   Administration





  Pain   





     





     





     


 


Nicotine  14 mg  12/16/19 22:00  12/21/19 20:59





  Nicoderm Patch  TD   Not Given





  Q24HR SUSAN   





     





     





     





     


 


Ondansetron HCl  4 mg  12/16/19 21:13  12/21/19 15:16





  Zofran  IVP   4 mg





  Q6H PRN   Administration





  Nausea/Vomiting   





     





     





     


 


Sodium Chloride  10 ml  12/17/19 09:00  12/22/19 09:35





  Flush - Normal Saline  IVF   10 ml





  Q12HR SUSAN   Administration





     





     





     





     


 


Sodium Chloride  10 ml  12/16/19 22:06  12/20/19 01:47





  Flush - Normal Saline  IVF   10 ml





  PRN PRN   Administration





  Saline Flush   





     





     





     














- Exam


General Appearance: awake alert


Eye: PERRL, anicteric sclera


ENT: no oropharyngeal lesions, moist mucosa


Neck: supple, no JVD


Heart: RRR, no murmur


Respiratory: no wheezes, no rales


Gastrointestinal: soft, normal bowel sounds, distended


Extremities: no cyanosis, no edema


Neurological: cranial nerve grossly intact, no focal deficits


Psychiatric: A&O x 3





Hosp A/P


(1) Colon cancer metastasized to liver


Code(s): C18.9 - MALIGNANT NEOPLASM OF COLON, UNSPECIFIED; C78.7 - SECONDARY 

MALIG NEOPLASM OF LIVER AND INTRAHEPATIC BILE DUCT   Status: Chronic   





(2) Hypertension


Code(s): I10 - ESSENTIAL (PRIMARY) HYPERTENSION   Status: Chronic   


Qualifiers: 


   Hypertension type: essential hypertension   Qualified Code(s): I10 - 

Essential (primary) hypertension   





(3) Leukocytosis


Code(s): D72.829 - ELEVATED WHITE BLOOD CELL COUNT, UNSPECIFIED   Status: Acute

   





(4) DVT (deep venous thrombosis)


Code(s): I82.409 - ACUTE EMBOLISM AND THOMBOS UNSP DEEP VN UNSP LOWER EXTREMITY

   Status: Chronic   


Qualifiers: 


   DVT location: lower extremity   Chronicity: chronic 





(5) Lung metastases


Code(s): C78.00 - SECONDARY MALIGNANT NEOPLASM OF UNSPECIFIED LUNG   Status: 

Chronic   





- Plan





abdominal pain with liver mets and ascites


pt has innumerable liver mets


still getting chemotherapy


poor prognosis, palliative care for hospice eval if ok with Oncology


to amb as tolerated


on fentanyl 75mcg tts, morphine and narco prn


off antibiotics


got neulasta, likely reason for elevated wbc's, last chemo was on Monday, wbc 

normal this am


continue eliquis, norvasc


hemostable


may dc anytime home if patient is comfortable going home


d/w patient and  at bedside

## 2019-12-23 VITALS — TEMPERATURE: 96.2 F

## 2019-12-23 VITALS — DIASTOLIC BLOOD PRESSURE: 84 MMHG | SYSTOLIC BLOOD PRESSURE: 176 MMHG

## 2019-12-23 RX ADMIN — Medication SCH ML: at 09:20

## 2019-12-23 RX ADMIN — ONDANSETRON PRN MG: 2 INJECTION INTRAMUSCULAR; INTRAVENOUS at 03:52

## 2019-12-23 RX ADMIN — Medication SCH SPR: at 09:20

## 2019-12-23 RX ADMIN — Medication PRN ML: at 04:39

## 2019-12-23 NOTE — PDOC.MOPN
Interval History: 





no pain in several days, complains of BLE swelling.





- Vital Signs


Vital Signs: 


 Vital Signs (12 hours)











  Temp Pulse Resp BP BP Pulse Ox


 


 12/23/19 09:12   77   176/84 H  


 


 12/23/19 08:25  96.2 F L  77  16   176/87 H  94 L


 


 12/23/19 08:00       94 L


 


 12/23/19 04:45      168/90 H 


 


 12/23/19 03:51  96.8 F L  87  12   199/103 H  92 L








 Weight











Admit Weight                   104 lb 4.4 oz


 


Weight                         101 lb 3.04 oz











 Most Recent Monitor Data











Heart Rate from ECG            86


 


NIBP                           149/89


 


NIBP BP-Mean                   109


 


Respiration from ECG           13


 


SpO2                           87

















- Physical Exam


General: Alert, Oriented x3, No acute distress


HEENT: Atraumatic, PERRLA, EOMI, Mucous membr. moist/pink


Lungs: Clear to auscultation, Normal air movement


Cardiovascular: Regular rate, Normal S1, Normal S2, No murmurs, Gallops, Rubs


Abdomen: Other (mild distention)


Extremities: Other (1+ BLE edema)


Skin: No rashes, No breakdown, No significant lesion


Neurological: Normal speech





- Labs


Result Diagrams: 


 12/22/19 08:16





 12/22/19 08:16


Status: lab reviewed by me





A/P





- Problem


(1) Colon cancer metastasized to liver


Current Visit: Yes   Code(s): C18.9 - MALIGNANT NEOPLASM OF COLON, UNSPECIFIED; 

C78.7 - SECONDARY MALIG NEOPLASM OF LIVER AND INTRAHEPATIC BILE DUCT   Status: 

Chronic   





(2) Leukocytosis


Current Visit: Yes   Code(s): D72.829 - ELEVATED WHITE BLOOD CELL COUNT, 

UNSPECIFIED   Status: Acute   





(3) Sepsis


Current Visit: Yes   Code(s): A41.9 - SEPSIS, UNSPECIFIED ORGANISM   Status: 

Acute   





- Plan


Plan: 





Continue pain meds.


continue laxatives and stool softeners.


Follow-up as scheduled next week with Dr. Shane page home per shamir

## 2019-12-23 NOTE — DIS
DATE OF ADMISSION:  12/16/2019



DATE OF DISCHARGE:  12/23/2019



DISCHARGE DISPOSITION:  Home.



PRIMARY DISCHARGE DIAGNOSES:  

1. Abdominal pain with intractable nausea and vomiting, resolved.

2. Colon cancer with multiple metastases, specifically liver and lungs.

3. Hypertension.

4. History of deep venous thrombosis.

5. Leukocytosis secondary to Neulasta.



PROCEDURES DONE DURING HOSPITALIZATION:  CT brain done showed no acute intracranial

abnormality.  CT abdomen and pelvis with IV contrast done showed pulmonary and

hepatic metastases with interval worsening since 06/12/2019, mild ascites.  Lumbar

spine MRI showed no evidence of metastatic disease.  Degenerative changes of the

disk at L4-5, L5-S1 was seen.  No central canal stenosis or high-grade

neuroforaminal stenosis was seen.  Pelvic MRI showed scattered osseous metastatic

disease without evidence of pathologic fracture.  Moderate ascites and prominent

anasarca.  Hepatic metastatic disease.  On the MRI, the patient had focus of

metastasis on the left iliac brim, superior right acetabulum.  No pathologic

fractures were seen.  Had a white count of 49 on the day of admission with white

count of 6.1 on the 22nd.  H and H on 22nd were 9.6 and 29, platelet count is 83,

.  BUN 17, creatinine 0.6, albumin is 2.4, AST 58, ALT 35, alkaline

phosphatase 544. 



INPATIENT CONSULT:  Ms. Luz Ruvalcaba, nurse practitioner, for Oncology.



DISCHARGE MEDICATIONS:  

1. Eliquis 2.5 mg p.o. twice daily.

2. Lasix 20 mg daily p.r.n.

3. Norco p.r.n. for pain.

4. Fentanyl 75 mcg transdermal q.72 hourly.

5. Ultram p.r.n. for pain.

6. Compazine 10 mg p.o. q.6 hourly p.r.n.

7. Zofran 8 mg p.o. p.r.n.



ALLERGIES:  NO KNOWN DRUG ALLERGIES.



DISCHARGE PLAN:  The patient to follow up with Dr. Delarosa as advised and primary

care physician in 1 week. 



BRIEF COURSE DURING HOSPITALIZATION:  The patient initially got hospitalized on 16th

after she almost passed out.  She also had generalized weakness.  The patient has

had her last chemo on Monday prior to hospitalization.  She also received Neulasta

then.  She had intermittent nausea, vomiting, and abdominal pain.  She has known

history of colon cancer with multiple mets to liver and lungs including bones as

mentioned above.  She also had history of right lower extremity DVT, on Eliquis.

She has had multiple workups done.  Initial suspicion was for sepsis in view of

elevated white count, which is ruled out as the patient's cultures were all

negative.  Her elevated white count is secondary to Neulasta that she received with

chemotherapy.  Her overall prognosis is guarded to poor in view of multiple

metastases.  Her entire liver is almost filled with metastasis.  The patient and her

 are aware of the above progress of her cancer.  Her nausea and vomiting have

resolved, and the patient is tolerating oral diet.  She is also ambulating inside

the room and a bit outside as well.  She is hemodynamically stable and will be

shortly discharged home.  She needs to follow up with Dr. Delarosa, our oncologist,

as advised.  Please note I have seen and examined the patient on the day of

discharge. 







Job ID:  [id number]

## 2020-02-06 ENCOUNTER — HOSPITAL ENCOUNTER (OUTPATIENT)
Dept: HOSPITAL 92 - ULT | Age: 65
Discharge: HOME | End: 2020-02-06
Attending: INTERNAL MEDICINE
Payer: COMMERCIAL

## 2020-02-06 DIAGNOSIS — R18.8: ICD-10-CM

## 2020-02-06 DIAGNOSIS — C18.6: Primary | ICD-10-CM

## 2020-02-06 DIAGNOSIS — C78.7: ICD-10-CM

## 2020-02-06 DIAGNOSIS — R14.0: ICD-10-CM

## 2020-02-06 PROCEDURE — 76705 ECHO EXAM OF ABDOMEN: CPT

## 2020-02-06 NOTE — ULT
Sonogram right upper quadrant



HISTORY: Liver metastases. Evaluate for ascites.



FINDINGS: Sonographic evaluation the right upper quadrant shows small amount of fluid around the uppe
r portions of the liver.



The other abdominal quadrants show no significant fluid. Heterogeneous echotexture of the liver consi
stent with widespread metastatic disease.











IMPRESSION: Small amount right upper quadrant fluid. While, in theory, this could contribute to short
ness of breath or focal right upper quadrant discomfort, the small volume would not explain

abdominal distention.



Reported By: LEROY Ponce 

Electronically Signed:  2/6/2020 11:11 AM